# Patient Record
Sex: MALE | Race: OTHER | Employment: OTHER | URBAN - METROPOLITAN AREA
[De-identification: names, ages, dates, MRNs, and addresses within clinical notes are randomized per-mention and may not be internally consistent; named-entity substitution may affect disease eponyms.]

---

## 2018-02-13 ENCOUNTER — APPOINTMENT (OUTPATIENT)
Dept: CT IMAGING | Age: 63
End: 2018-02-13
Attending: EMERGENCY MEDICINE
Payer: COMMERCIAL

## 2018-02-13 ENCOUNTER — APPOINTMENT (OUTPATIENT)
Dept: CT IMAGING | Age: 63
End: 2018-02-13
Attending: PHYSICIAN ASSISTANT
Payer: COMMERCIAL

## 2018-02-13 ENCOUNTER — HOSPITAL ENCOUNTER (OUTPATIENT)
Age: 63
Setting detail: OBSERVATION
Discharge: HOME | End: 2018-02-14
Attending: EMERGENCY MEDICINE | Admitting: HOSPITALIST
Payer: COMMERCIAL

## 2018-02-13 ENCOUNTER — APPOINTMENT (OUTPATIENT)
Dept: MRI IMAGING | Age: 63
End: 2018-02-13
Attending: EMERGENCY MEDICINE
Payer: COMMERCIAL

## 2018-02-13 DIAGNOSIS — R20.0 LEFT SIDED NUMBNESS: Primary | ICD-10-CM

## 2018-02-13 DIAGNOSIS — R51.9 ACUTE NONINTRACTABLE HEADACHE, UNSPECIFIED HEADACHE TYPE: ICD-10-CM

## 2018-02-13 DIAGNOSIS — G45.9 TRANSIENT CEREBRAL ISCHEMIA, UNSPECIFIED TYPE: ICD-10-CM

## 2018-02-13 DIAGNOSIS — E86.0 DEHYDRATION: ICD-10-CM

## 2018-02-13 PROBLEM — Z86.73 HISTORY OF CVA (CEREBROVASCULAR ACCIDENT): Status: ACTIVE | Noted: 2018-02-13

## 2018-02-13 PROBLEM — Z85.850 HISTORY OF THYROID CANCER: Status: ACTIVE | Noted: 2018-02-13

## 2018-02-13 PROBLEM — I10 HYPERTENSION: Status: ACTIVE | Noted: 2018-02-13

## 2018-02-13 LAB
ALBUMIN SERPL-MCNC: 4 G/DL (ref 3.4–5)
ALBUMIN/GLOB SERPL: 1.1 {RATIO} (ref 0.8–1.7)
ALP SERPL-CCNC: 59 U/L (ref 45–117)
ALT SERPL-CCNC: 41 U/L (ref 16–61)
ANION GAP SERPL CALC-SCNC: 7 MMOL/L (ref 3–18)
APPEARANCE UR: CLEAR
AST SERPL-CCNC: 22 U/L (ref 15–37)
ATRIAL RATE: 82 BPM
BACTERIA URNS QL MICRO: NEGATIVE /HPF
BASOPHILS # BLD: 0 K/UL (ref 0–0.06)
BASOPHILS NFR BLD: 0 % (ref 0–2)
BILIRUB SERPL-MCNC: 0.3 MG/DL (ref 0.2–1)
BILIRUB UR QL: NEGATIVE
BUN SERPL-MCNC: 17 MG/DL (ref 7–18)
BUN/CREAT SERPL: 13 (ref 12–20)
CALCIUM SERPL-MCNC: 8.2 MG/DL (ref 8.5–10.1)
CALCULATED P AXIS, ECG09: 46 DEGREES
CALCULATED T AXIS, ECG11: 25 DEGREES
CHLORIDE SERPL-SCNC: 104 MMOL/L (ref 100–108)
CK MB CFR SERPL CALC: 0.8 % (ref 0–4)
CK MB SERPL-MCNC: 1.9 NG/ML (ref 5–25)
CK SERPL-CCNC: 237 U/L (ref 39–308)
CO2 SERPL-SCNC: 31 MMOL/L (ref 21–32)
COLOR UR: YELLOW
CREAT SERPL-MCNC: 1.26 MG/DL (ref 0.6–1.3)
DIAGNOSIS, 93000: NORMAL
DIFFERENTIAL METHOD BLD: ABNORMAL
EOSINOPHIL # BLD: 0.1 K/UL (ref 0–0.4)
EOSINOPHIL NFR BLD: 1 % (ref 0–5)
EPITH CASTS URNS QL MICRO: NEGATIVE /LPF (ref 0–5)
ERYTHROCYTE [DISTWIDTH] IN BLOOD BY AUTOMATED COUNT: 13.5 % (ref 11.6–14.5)
GLOBULIN SER CALC-MCNC: 3.5 G/DL (ref 2–4)
GLUCOSE BLD STRIP.AUTO-MCNC: 88 MG/DL (ref 70–110)
GLUCOSE SERPL-MCNC: 93 MG/DL (ref 74–99)
GLUCOSE UR STRIP.AUTO-MCNC: NEGATIVE MG/DL
HCT VFR BLD AUTO: 48.8 % (ref 36–48)
HGB BLD-MCNC: 16.2 G/DL (ref 13–16)
HGB UR QL STRIP: ABNORMAL
KETONES UR QL STRIP.AUTO: NEGATIVE MG/DL
LEUKOCYTE ESTERASE UR QL STRIP.AUTO: NEGATIVE
LYMPHOCYTES # BLD: 2.7 K/UL (ref 0.9–3.6)
LYMPHOCYTES NFR BLD: 33 % (ref 21–52)
MAGNESIUM SERPL-MCNC: 2 MG/DL (ref 1.6–2.6)
MCH RBC QN AUTO: 30.8 PG (ref 24–34)
MCHC RBC AUTO-ENTMCNC: 33.2 G/DL (ref 31–37)
MCV RBC AUTO: 92.8 FL (ref 74–97)
MONOCYTES # BLD: 0.8 K/UL (ref 0.05–1.2)
MONOCYTES NFR BLD: 10 % (ref 3–10)
NEUTS SEG # BLD: 4.6 K/UL (ref 1.8–8)
NEUTS SEG NFR BLD: 56 % (ref 40–73)
NITRITE UR QL STRIP.AUTO: NEGATIVE
P-R INTERVAL, ECG05: 170 MS
PH UR STRIP: 5.5 [PH] (ref 5–8)
PLATELET # BLD AUTO: 220 K/UL (ref 135–420)
PMV BLD AUTO: 9.3 FL (ref 9.2–11.8)
POTASSIUM SERPL-SCNC: 4 MMOL/L (ref 3.5–5.5)
PROT SERPL-MCNC: 7.5 G/DL (ref 6.4–8.2)
PROT UR STRIP-MCNC: NEGATIVE MG/DL
Q-T INTERVAL, ECG07: 348 MS
QRS DURATION, ECG06: 84 MS
QTC CALCULATION (BEZET), ECG08: 406 MS
RBC # BLD AUTO: 5.26 M/UL (ref 4.7–5.5)
RBC #/AREA URNS HPF: NEGATIVE /HPF (ref 0–5)
SODIUM SERPL-SCNC: 142 MMOL/L (ref 136–145)
SP GR UR REFRACTOMETRY: 1.01 (ref 1–1.03)
TROPONIN I SERPL-MCNC: <0.02 NG/ML (ref 0–0.06)
UROBILINOGEN UR QL STRIP.AUTO: 0.2 EU/DL (ref 0.2–1)
VENTRICULAR RATE, ECG03: 82 BPM
WBC # BLD AUTO: 8.2 K/UL (ref 4.6–13.2)
WBC URNS QL MICRO: NEGATIVE /HPF (ref 0–5)

## 2018-02-13 PROCEDURE — 99285 EMERGENCY DEPT VISIT HI MDM: CPT

## 2018-02-13 PROCEDURE — 70498 CT ANGIOGRAPHY NECK: CPT

## 2018-02-13 PROCEDURE — 70551 MRI BRAIN STEM W/O DYE: CPT

## 2018-02-13 PROCEDURE — 82962 GLUCOSE BLOOD TEST: CPT

## 2018-02-13 PROCEDURE — 74011250637 HC RX REV CODE- 250/637: Performed by: HOSPITALIST

## 2018-02-13 PROCEDURE — 80053 COMPREHEN METABOLIC PANEL: CPT | Performed by: EMERGENCY MEDICINE

## 2018-02-13 PROCEDURE — 92610 EVALUATE SWALLOWING FUNCTION: CPT

## 2018-02-13 PROCEDURE — 70450 CT HEAD/BRAIN W/O DYE: CPT

## 2018-02-13 PROCEDURE — 74011250637 HC RX REV CODE- 250/637: Performed by: PHYSICIAN ASSISTANT

## 2018-02-13 PROCEDURE — 82550 ASSAY OF CK (CPK): CPT | Performed by: EMERGENCY MEDICINE

## 2018-02-13 PROCEDURE — G8998 SWALLOW D/C STATUS: HCPCS

## 2018-02-13 PROCEDURE — 74011250636 HC RX REV CODE- 250/636: Performed by: EMERGENCY MEDICINE

## 2018-02-13 PROCEDURE — 85025 COMPLETE CBC W/AUTO DIFF WBC: CPT | Performed by: EMERGENCY MEDICINE

## 2018-02-13 PROCEDURE — 74011636320 HC RX REV CODE- 636/320: Performed by: EMERGENCY MEDICINE

## 2018-02-13 PROCEDURE — 74011250636 HC RX REV CODE- 250/636: Performed by: PHYSICIAN ASSISTANT

## 2018-02-13 PROCEDURE — 96361 HYDRATE IV INFUSION ADD-ON: CPT

## 2018-02-13 PROCEDURE — G8996 SWALLOW CURRENT STATUS: HCPCS

## 2018-02-13 PROCEDURE — G8997 SWALLOW GOAL STATUS: HCPCS

## 2018-02-13 PROCEDURE — 93005 ELECTROCARDIOGRAM TRACING: CPT

## 2018-02-13 PROCEDURE — 81001 URINALYSIS AUTO W/SCOPE: CPT | Performed by: EMERGENCY MEDICINE

## 2018-02-13 PROCEDURE — 83735 ASSAY OF MAGNESIUM: CPT | Performed by: EMERGENCY MEDICINE

## 2018-02-13 PROCEDURE — 99218 HC RM OBSERVATION: CPT

## 2018-02-13 PROCEDURE — 74011250637 HC RX REV CODE- 250/637: Performed by: EMERGENCY MEDICINE

## 2018-02-13 RX ORDER — LOSARTAN POTASSIUM 100 MG/1
100 TABLET ORAL DAILY
COMMUNITY

## 2018-02-13 RX ORDER — LOSARTAN POTASSIUM 50 MG/1
25 TABLET ORAL DAILY
Status: DISCONTINUED | OUTPATIENT
Start: 2018-02-14 | End: 2018-02-13

## 2018-02-13 RX ORDER — CLOPIDOGREL BISULFATE 75 MG/1
75 TABLET ORAL DAILY
Status: DISCONTINUED | OUTPATIENT
Start: 2018-02-14 | End: 2018-02-14 | Stop reason: HOSPADM

## 2018-02-13 RX ORDER — OXCARBAZEPINE 150 MG/1
600 TABLET, FILM COATED ORAL 2 TIMES DAILY
Status: DISCONTINUED | OUTPATIENT
Start: 2018-02-13 | End: 2018-02-14 | Stop reason: HOSPADM

## 2018-02-13 RX ORDER — LABETALOL HYDROCHLORIDE 5 MG/ML
5 INJECTION, SOLUTION INTRAVENOUS
Status: DISCONTINUED | OUTPATIENT
Start: 2018-02-13 | End: 2018-02-14 | Stop reason: HOSPADM

## 2018-02-13 RX ORDER — SODIUM CHLORIDE 0.9 % (FLUSH) 0.9 %
5-10 SYRINGE (ML) INJECTION EVERY 8 HOURS
Status: DISCONTINUED | OUTPATIENT
Start: 2018-02-13 | End: 2018-02-14 | Stop reason: HOSPADM

## 2018-02-13 RX ORDER — ACETAMINOPHEN 325 MG/1
650 TABLET ORAL
Status: DISCONTINUED | OUTPATIENT
Start: 2018-02-13 | End: 2018-02-14 | Stop reason: HOSPADM

## 2018-02-13 RX ORDER — LOSARTAN POTASSIUM 50 MG/1
100 TABLET ORAL DAILY
Status: DISCONTINUED | OUTPATIENT
Start: 2018-02-14 | End: 2018-02-14 | Stop reason: HOSPADM

## 2018-02-13 RX ORDER — GUAIFENESIN 100 MG/5ML
162 LIQUID (ML) ORAL DAILY
Status: DISCONTINUED | OUTPATIENT
Start: 2018-02-14 | End: 2018-02-14 | Stop reason: HOSPADM

## 2018-02-13 RX ORDER — ASPIRIN 81 MG/1
162 TABLET ORAL DAILY
COMMUNITY

## 2018-02-13 RX ORDER — SODIUM CHLORIDE 0.9 % (FLUSH) 0.9 %
5-10 SYRINGE (ML) INJECTION AS NEEDED
Status: DISCONTINUED | OUTPATIENT
Start: 2018-02-13 | End: 2018-02-14 | Stop reason: HOSPADM

## 2018-02-13 RX ORDER — ATORVASTATIN CALCIUM 20 MG/1
40 TABLET, FILM COATED ORAL
Status: DISCONTINUED | OUTPATIENT
Start: 2018-02-13 | End: 2018-02-14 | Stop reason: HOSPADM

## 2018-02-13 RX ORDER — ATORVASTATIN CALCIUM 40 MG/1
40 TABLET, FILM COATED ORAL DAILY
COMMUNITY

## 2018-02-13 RX ORDER — CYCLOBENZAPRINE HCL 10 MG
10 TABLET ORAL
COMMUNITY

## 2018-02-13 RX ORDER — LEVOTHYROXINE SODIUM 75 UG/1
75 TABLET ORAL
COMMUNITY

## 2018-02-13 RX ORDER — LANOLIN ALCOHOL/MO/W.PET/CERES
1000 CREAM (GRAM) TOPICAL DAILY
COMMUNITY

## 2018-02-13 RX ORDER — GUAIFENESIN 100 MG/5ML
162 LIQUID (ML) ORAL
Status: COMPLETED | OUTPATIENT
Start: 2018-02-13 | End: 2018-02-13

## 2018-02-13 RX ORDER — SODIUM CHLORIDE 9 MG/ML
125 INJECTION, SOLUTION INTRAVENOUS CONTINUOUS
Status: DISPENSED | OUTPATIENT
Start: 2018-02-13 | End: 2018-02-14

## 2018-02-13 RX ORDER — LEVOTHYROXINE SODIUM 75 UG/1
75 TABLET ORAL
Status: DISCONTINUED | OUTPATIENT
Start: 2018-02-14 | End: 2018-02-14 | Stop reason: HOSPADM

## 2018-02-13 RX ORDER — CLOPIDOGREL BISULFATE 75 MG/1
75 TABLET ORAL DAILY
COMMUNITY

## 2018-02-13 RX ADMIN — OXCARBAZEPINE 600 MG: 150 TABLET ORAL at 21:35

## 2018-02-13 RX ADMIN — SODIUM CHLORIDE 1000 ML: 900 INJECTION, SOLUTION INTRAVENOUS at 14:06

## 2018-02-13 RX ADMIN — ATORVASTATIN CALCIUM 40 MG: 20 TABLET, FILM COATED ORAL at 21:35

## 2018-02-13 RX ADMIN — IOPAMIDOL 100 ML: 755 INJECTION, SOLUTION INTRAVENOUS at 16:20

## 2018-02-13 RX ADMIN — ASPIRIN 81 MG 162 MG: 81 TABLET ORAL at 14:06

## 2018-02-13 RX ADMIN — Medication 10 ML: at 18:08

## 2018-02-13 RX ADMIN — SODIUM CHLORIDE 125 ML/HR: 900 INJECTION, SOLUTION INTRAVENOUS at 18:07

## 2018-02-13 RX ADMIN — Medication 10 ML: at 21:36

## 2018-02-13 NOTE — PROGRESS NOTES
Admission Medication Reconciliation has been performed on this ED patient consisting of interview of the patient regarding their PTA Home Medication List, Allergies and PMH as well as obtaining outpatient pharmacy information. Interviewed patient who was a good historian and is health literate. Patient did not provide a written list of home medications. Medications are managed by: self  Patient's outpatient pharmacy is [facility]  Smoking status is: denies  Alcohol use: denies  Illicit drug use: denies  Patient ABX use within the past 30 days = none  Has patient received any antineoplastics in the past 30 days? na  Has patient received any radiation treatments in the past 45 days? No but last radiation tx for thyroid CA was 2yr ago      Medication Reconciliation Interventions:   Wrong Medication Identified 0  Wrong/missing medication strength or dose identified  0  Wrong/missing Interval Identified 0  Wrong/missing Route Identified 0  Medication Duplication 0  Omissions 7  Commissions 0  Other Issue(s) Identified (Indicate): 0            Medication Compliance Issues and/or Medication Concerns: Added hyperlipidemia, HTN, seizures, hx of thyroidectomy to [facility] list as the patient's PTA Med List contains medication for this indication. Pt states despite thyroidectomy in 1976 he developed tumors in the thyroid area several time and received radiation tx with last tx ~ 2yr ago. Pt states he currently has growth in thyroid area but it has been determined to be benign. Note: if Oxtellar is to be continued for admission pt will need to bring from home as it is a non formulary drug.       [facility] Pharmacist  [phone]
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
## 2018-02-13 NOTE — IP AVS SNAPSHOT
Summary of Care Report The Summary of Care report has been created to help improve care coordination. Users with access to [employer] or [address] (Web-based application) may access additional patient information including the Discharge Summary. If you are not currently a [address] user and need more information, please call the number listed below in the [place] [address] section and ask to be connected with Medical Records. Facility Information Name Address Phone [address] [facility] [address] [phone] Patient Information Patient Name Sex NASH Guerrero ([id number]) Male 1955 Discharge Information Admitting Provider Service Area Unit Victor Hugo Brar MD / [phone] 8 [address] Card/Med / [phone] Discharge Provider Discharge Date/Time Discharge Disposition Destination (none) 2018 (Pending) AHR (none) Patient Language Language ENGLISH [13] Hospital Problems as of 2018  Never Reviewed Class Noted - Resolved Last Modified POA Active Problems * (Principal)TIA (transient ischemic attack)  2018 - Present 2018 by Victor Hugo Brar MD Yes Entered by Buster Payne MD  
  History of CVA (cerebrovascular accident)  2018 - Present 2018 by Maria Del Carmen Barrera PA-C Unknown Entered by Maria Del Carmen Barrera PA-C Hypertension  2018 - Present 2018 by Maria Del Carmen Barrera PA-C Unknown Entered by Maria Del Carmen Barrera PA-C History of thyroid cancer  2018 - Present 2018 by Maria Del Carmen Barrera PA-C Unknown Entered by Maria Del Carmen Barrera PA-C You are allergic to the following No active allergies Current Discharge Medication List  
  
CONTINUE these medications which have NOT CHANGED Dose & Instructions Dispensing Information Comments  
 aspirin delayed-release 81 mg tablet Dose:  162 mg Take 162 mg by mouth daily. Refills:  0  
   
 cyanocobalamin 1,000 mcg tablet Dose:  1000 mcg Take 1,000 mcg by mouth daily. Refills:  0  
   
 cyclobenzaprine 10 mg tablet Commonly known as:  FLEXERIL Dose:  10 mg Take 10 mg by mouth three (3) times daily as needed for Muscle Spasm(s). Refills:  0 LIPITOR 40 mg tablet Generic drug:  atorvastatin Dose:  40 mg Take 40 mg by mouth daily. Refills:  0  
   
 losartan 100 mg tablet Commonly known as:  COZAAR Dose:  100 mg Take 100 mg by mouth daily. Refills:  0 OXTELLAR  mg Tb24 Generic drug:  OXcarbazepine Dose:  1 Tab Take 1 Tab by mouth two (2) times a day. At noon and again at bedtime Refills:  0 PLAVIX 75 mg Tab Generic drug:  clopidogrel Dose:  75 mg Take 75 mg by mouth daily. Refills:  0  
   
 SYNTHROID 75 mcg tablet Generic drug:  levothyroxine Dose:  75 mcg Take 75 mcg by mouth Daily (before breakfast). Refills:  0 Follow-up Information Follow up With Details Comments Contact Info Phys Other, MD   Patient can only remember the practice name and not the physician Discharge Instructions DISCHARGE SUMMARY from Nurse PATIENT INSTRUCTIONS: 
 
Recommended activity: Activity as tolerated. *  Please give a list of your current medications to your Primary Care Provider. *  Please update this list whenever your medications are discontinued, doses are 
    changed, or new medications (including over-the-counter products) are added. *  Please carry medication information at all times in case of emergency situations. These are general instructions for a healthy lifestyle: No smoking/ No tobacco products/ Avoid exposure to second hand smoke Surgeon General's Warning:  Quitting smoking now greatly reduces serious risk to your health. Obesity, smoking, and sedentary lifestyle greatly increases your risk for illness A healthy diet, regular physical exercise & weight monitoring are important for maintaining a healthy lifestyle You may be retaining fluid if you have a history of heart failure or if you experience any of the following symptoms:  Weight gain of 3 pounds or more overnight or 5 pounds in a week, increased swelling in our hands or feet or shortness of breath while lying flat in bed. Please call your doctor as soon as you notice any of these symptoms; do not wait until your next office visit. Recognize signs and symptoms of STROKE: 
 
F-face looks uneven A-arms unable to move or move unevenly S-speech slurred or non-existent T-time-call 911 as soon as signs and symptoms begin-DO NOT go Back to bed or wait to see if you get better-TIME IS BRAIN. Warning Signs of HEART ATTACK Call 911 if you have these symptoms: 
? Chest discomfort. Most heart attacks involve discomfort in the center of the chest that lasts more than a few minutes, or that goes away and comes back. It can feel like uncomfortable pressure, squeezing, fullness, or pain. ? Discomfort in other areas of the upper body. Symptoms can include pain or discomfort in one or both arms, the back, neck, jaw, or stomach. ? Shortness of breath with or without chest discomfort. ? Other signs may include breaking out in a cold sweat, nausea, or lightheadedness. Don't wait more than five minutes to call [address]! Fast action can save your life. Calling 911 is almost always the fastest way to get lifesaving treatment. Emergency Medical Services staff can begin treatment when they arrive  up to an hour sooner than if someone gets to the hospital by car.   
 
The discharge information has been reviewed with the {PATIENT PARENT GUARDIAN:66393}. The {PATIENT PARENT GUARDIAN:77313} verbalized understanding. Discharge medications reviewed with the {Dishcarge meds reviewed TKLK:42414} and appropriate educational materials and side effects teaching were provided. ___________________________________________________________________________________________________________________________________ Headache: Care Instructions Your Care Instructions Headaches have many possible causes. Most headaches aren't a sign of a more serious problem, and they will get better on their own. Home treatment may help you feel better faster. The doctor has checked you carefully, but problems can develop later. If you notice any problems or new symptoms, get medical treatment right away. Follow-up care is a key part of your treatment and safety. Be sure to make and go to all appointments, and call your doctor if you are having problems. It's also a good idea to know your test results and keep a list of the medicines you take. How can you care for yourself at home? · Do not drive if you have taken a prescription pain medicine. · Rest in a quiet, dark room until your headache is gone. Close your eyes and try to relax or go to sleep. Don't watch TV or read. · Put a cold, moist cloth or cold pack on the painful area for 10 to 20 minutes at a time. Put a thin cloth between the cold pack and your skin. · Use a warm, moist towel or a heating pad set on low to relax tight shoulder and neck muscles. · Have someone gently massage your neck and shoulders. · Take pain medicines exactly as directed. ¨ If the doctor gave you a prescription medicine for pain, take it as prescribed. ¨ If you are not taking a prescription pain medicine, ask your doctor if you can take an over-the-counter medicine. · Be careful not to take pain medicine more often than the instructions allow, because you may get worse or more frequent headaches when the medicine wears off. · Do not ignore new symptoms that occur with a headache, such as a fever, weakness or numbness, vision changes, or confusion. These may be signs of a more serious problem. To prevent headaches · Keep a headache diary so you can figure out what triggers your headaches. Avoiding triggers may help you prevent headaches. Record when each headache began, how long it lasted, and what the pain was like (throbbing, aching, stabbing, or dull). Write down any other symptoms you had with the headache, such as nausea, flashing lights or dark spots, or sensitivity to bright light or loud noise. Note if the headache occurred near your period. List anything that might have triggered the headache, such as certain foods (chocolate, cheese, wine) or odors, smoke, bright light, stress, or lack of sleep. · Find healthy ways to deal with stress. Headaches are most common during or right after stressful times. Take time to relax before and after you do something that has caused a headache in the past. 
· Try to keep your muscles relaxed by keeping good posture. Check your jaw, face, neck, and shoulder muscles for tension, and try relaxing them. When sitting at a desk, change positions often, and stretch for 30 seconds each hour. · Get plenty of sleep and exercise. · Eat regularly and well. Long periods without food can trigger a headache. · Treat yourself to a massage. Some people find that regular massages are very helpful in relieving tension. · Limit caffeine by not drinking too much coffee, tea, or soda. But don't quit caffeine suddenly, because that can also give you headaches. · Reduce eyestrain from computers by blinking frequently and looking away from the computer screen every so often. Make sure you have proper eyewear and that your monitor is set up properly, about an arm's length away. · Seek help if you have depression or anxiety.  Your headaches may be linked to these conditions. Treatment can both prevent headaches and help with symptoms of anxiety or depression. When should you call for help? Call 911 anytime you think you may need emergency care. For example, call if: 
? · You have signs of a stroke. These may include: 
¨ Sudden numbness, paralysis, or weakness in your face, arm, or leg, especially on only one side of your body. ¨ Sudden vision changes. ¨ Sudden trouble speaking. ¨ Sudden confusion or trouble understanding simple statements. ¨ Sudden problems with walking or balance. ¨ A sudden, severe headache that is different from past headaches. ?Call your doctor now or seek immediate medical care if: 
? · You have a new or worse headache. ? · Your headache gets much worse. Where can you learn more? Go to http://holland-karine.info/. Enter M271 in the search box to learn more about \"Headache: Care Instructions. \" Current as of: October 14, 2016 Content Version: 11.4 © 0756-7599 [employer]ES[employer]. Care instructions adapted under license by [employer] (which disclaims liability or warranty for this information). If you have questions about a medical condition or this instruction, always ask your healthcare professional. [place] [address] any warranty or liability for your use of this information. High Blood Pressure: Care Instructions Your Care Instructions If your blood pressure is usually above 140/90, you have high blood pressure, or hypertension. That means the top number is 140 or higher or the bottom number is 90 or higher, or both. Despite what a lot of people think, high blood pressure usually doesn't cause headaches or make you feel dizzy or lightheaded. It usually has no symptoms. But it does increase your risk for heart attack, stroke, and kidney or eye damage. The higher your blood pressure, the more your risk increases. Your doctor will give you a goal for your blood pressure. Your goal will be based on your health and your age. An example of a goal is to keep your blood pressure below 140/90. Lifestyle changes, such as eating healthy and being active, are always important to help lower blood pressure. You might also take medicine to reach your blood pressure goal. 
Follow-up care is a key part of your treatment and safety. Be sure to make and go to all appointments, and call your doctor if you are having problems. It's also a good idea to know your test results and keep a list of the medicines you take. How can you care for yourself at home? Medical treatment · If you stop taking your medicine, your blood pressure will go back up. You may take one or more types of medicine to lower your blood pressure. Be safe with medicines. Take your medicine exactly as prescribed. Call your doctor if you think you are having a problem with your medicine. · Talk to your doctor before you start taking aspirin every day. Aspirin can help certain people lower their risk of a heart attack or stroke. But taking aspirin isn't right for everyone, because it can cause serious bleeding. · See your doctor regularly. You may need to see the doctor more often at first or until your blood pressure comes down. · If you are taking blood pressure medicine, talk to your doctor before you take decongestants or anti-inflammatory medicine, such as ibuprofen. Some of these medicines can raise blood pressure. · Learn how to check your blood pressure at home. Lifestyle changes · Stay at a healthy weight. This is especially important if you put on weight around the waist. Losing even 10 pounds can help you lower your blood pressure. · If your doctor recommends it, get more exercise. Walking is a good choice. Bit by bit, increase the amount you walk every day. Try for at least 30 minutes on most days of the week.  You also may want to swim, bike, or do other activities. · Avoid or limit alcohol. Talk to your doctor about whether you can drink any alcohol. · Try to limit how much sodium you eat to less than 2,300 milligrams (mg) a day. Your doctor may ask you to try to eat less than 1,500 mg a day. · Eat plenty of fruits (such as bananas and oranges), vegetables, legumes, whole grains, and low-fat dairy products. · Lower the amount of saturated fat in your diet. Saturated fat is found in animal products such as milk, cheese, and meat. Limiting these foods may help you lose weight and also lower your risk for heart disease. · Do not smoke. Smoking increases your risk for heart attack and stroke. If you need help quitting, talk to your doctor about stop-smoking programs and medicines. These can increase your chances of quitting for good. When should you call for help? Call 911 anytime you think you may need emergency care. This may mean having symptoms that suggest that your blood pressure is causing a serious heart or blood vessel problem. Your blood pressure may be over 180/110. ? For example, call 911 if: 
? · You have symptoms of a heart attack. These may include: ¨ Chest pain or pressure, or a strange feeling in the chest. 
¨ Sweating. ¨ Shortness of breath. ¨ Nausea or vomiting. ¨ Pain, pressure, or a strange feeling in the back, neck, jaw, or upper belly or in one or both shoulders or arms. ¨ Lightheadedness or sudden weakness. ¨ A fast or irregular heartbeat. ? · You have symptoms of a stroke. These may include: 
¨ Sudden numbness, tingling, weakness, or loss of movement in your face, arm, or leg, especially on only one side of your body. ¨ Sudden vision changes. ¨ Sudden trouble speaking. ¨ Sudden confusion or trouble understanding simple statements. ¨ Sudden problems with walking or balance. ¨ A sudden, severe headache that is different from past headaches. ? · You have severe back or belly pain. ?Do not wait until your blood pressure comes down on its own. Get help right away. ?Call your doctor now or seek immediate care if: 
? · Your blood pressure is much higher than normal (such as 180/110 or higher), but you don't have symptoms. ? · You think high blood pressure is causing symptoms, such as: ¨ Severe headache. ¨ Blurry vision. ? Watch closely for changes in your health, and be sure to contact your doctor if: 
? · Your blood pressure measures 140/90 or higher at least 2 times. That means the top number is 140 or higher or the bottom number is 90 or higher, or both. ? · You think you may be having side effects from your blood pressure medicine. ? · Your blood pressure is usually normal, but it goes above normal at least 2 times. Where can you learn more? Go to http://holland-karnie.info/. Enter V995 in the search box to learn more about \"High Blood Pressure: Care Instructions. \" Current as of: September 21, 2016 Content Version: 11.4 © 6767-5300 [employer]eecho. Care instructions adapted under license by [employer] (which disclaims liability or warranty for this information). If you have questions about a medical condition or this instruction, always ask your healthcare professional. [place] [address] any warranty or liability for your use of this information. Transient Ischemic Attack: Care Instructions Your Care Instructions A transient ischemic attack (TIA) is when blood flow to a part of your brain is blocked for a short time. A TIA is like a stroke but usually lasts only a few minutes. A TIA does not cause lasting brain damage. Any vision problems, slurred speech, or other symptoms usually go away in 10 to 20 minutes. But they may last for up to 24 hours. TIAs are often warning signs of a stroke. Some people who have a TIA may have a stroke in the future.  A stroke can cause symptoms like those of a TIA. But a stroke causes lasting damage to your brain. You can take steps to help prevent a stroke. One thing you can do is get early treatment. If you have other new symptoms, or if your symptoms do not get better, go back to the emergency room or call your doctor right away. Getting treatment right away may prevent long-term brain damage caused by a stroke. The doctor has checked you carefully, but problems can develop later. If you notice any problems or new symptoms, get medical treatment right away. Follow-up care is a key part of your treatment and safety. Be sure to make and go to all appointments, and call your doctor if you are having problems. It's also a good idea to know your test results and keep a list of the medicines you take. How can you care for yourself at home? Medicines ? · Be safe with medicines. Take your medicines exactly as prescribed. Call your doctor if you think you are having a problem with your medicine. ? · If you take a blood thinner, such as aspirin, be sure you get instructions about how to take your medicine safely. Blood thinners can cause serious bleeding problems. ? · Call your doctor if you are not able to take your medicines for any reason. ? · Do not take any over-the-counter medicines or herbal products without talking to your doctor first.  
? · If you take birth control pills or hormone therapy, talk to your doctor. Ask if these treatments are right for you. ? Lifestyle changes ? · Do not smoke. If you need help quitting, talk to your doctor about stop-smoking programs and medicines. ? · Be active. If your doctor recommends it, get more exercise. Walking is a good choice. Bit by bit, increase the amount you walk every day. Try for at least 30 minutes on most days of the week. You also may want to swim, bike, or do other activities. ? · Eat heart-healthy foods.  These include fruits, vegetables, high-fiber foods, fish, and foods that are low in sodium, saturated fat, and trans fat. ? · Stay at a healthy weight. Lose weight if you need to.  
? · Limit alcohol to 2 drinks a day for men and 1 drink a day for women. ?Staying healthy ? · Manage other health problems such as diabetes, high blood pressure, and high cholesterol. ? · Get the flu vaccine every year. When should you call for help? Call 911 anytime you think you may need emergency care. For example, call if: 
? · You have new or worse symptoms of a stroke. These may include: 
¨ Sudden numbness, tingling, weakness, or loss of movement in your face, arm, or leg, especially on only one side of your body. ¨ Sudden vision changes. ¨ Sudden trouble speaking. ¨ Sudden confusion or trouble understanding simple statements. ¨ Sudden problems with walking or balance. ¨ A sudden, severe headache that is different from past headaches. Call 911 even if these symptoms go away in a few minutes. ? · You feel like you are having another TIA. ? Watch closely for changes in your health, and be sure to contact your doctor if you have any problems. Where can you learn more? Go to http://holland-karine.info/. Enter [phone] in the search box to learn more about \"Transient Ischemic Attack: Care Instructions. \" Current as of: March 20, 2017 Content Version: 11.4 © 5420-2485 [employer]. Care instructions adapted under license by [employer] (which disclaims liability or warranty for this information). If you have questions about a medical condition or this instruction, always ask your healthcare professional. [place] [address] any warranty or liability for your use of this information. Chart Review Routing History No Routing History on File

## 2018-02-13 NOTE — IP AVS SNAPSHOT
[address] [address] 
[phone] Patient: Anny Camacho MRN: [id number] XTT:[id number] A check loraine indicates which time of day the medication should be taken. My Medications CONTINUE taking these medications Instructions Each Dose to Equal  
 Morning Noon Evening Bedtime  
 aspirin delayed-release 81 mg tablet Your last dose was: Your next dose is: Take 162 mg by mouth daily. 162 mg  
    
   
   
   
  
 cyanocobalamin 1,000 mcg tablet Your last dose was: Your next dose is: Take 1,000 mcg by mouth daily. 1000 mcg  
    
   
   
   
  
 cyclobenzaprine 10 mg tablet Commonly known as:  FLEXERIL Your last dose was: Your next dose is: Take 10 mg by mouth three (3) times daily as needed for Muscle Spasm(s). 10 mg  
    
   
   
   
  
 LIPITOR 40 mg tablet Generic drug:  atorvastatin Your last dose was: Your next dose is: Take 40 mg by mouth daily. 40 mg  
    
   
   
   
  
 losartan 100 mg tablet Commonly known as:  COZAAR Your last dose was: Your next dose is: Take 100 mg by mouth daily. 100 mg OXTELLAR  mg Tb24 Generic drug:  OXcarbazepine Your last dose was: Your next dose is: Take 1 Tab by mouth two (2) times a day. At noon and again at bedtime 1 Tab PLAVIX 75 mg Tab Generic drug:  clopidogrel Your last dose was: Your next dose is: Take 75 mg by mouth daily. 75 mg SYNTHROID 75 mcg tablet Generic drug:  levothyroxine Your last dose was: Your next dose is: Take 75 mcg by mouth Daily (before breakfast). 75 mcg

## 2018-02-13 NOTE — ED PROVIDER NOTES
EMERGENCY DEPARTMENT HISTORY AND PHYSICAL EXAM    Date: 2/13/2018  Patient Name: Jomar Angeles    History of Presenting Illness     Chief Complaint   Patient presents with    Headache    Extremity Weakness     L side         History Provided By: Patient    Chief Complaint: weakness  Duration: 4 Hours  Timing:  Acute  Location: right face and left extremities  Quality: weakness  Severity: Moderate  Associated Symptoms: denies any other associated signs or symptoms    Additional History (Context):   1:14 PM  Jomar Angeles is a 58 y.o. male with PMHX of TIA and stroke (no residual weakness) who presents to the emergency department via EMS C/O right face and left sided of body weakness onset 4 hours ago. Associated sxs include left hand and leg are weaker then right side. Prior to onset of sx's pt had mild Ha at base of skull. PMHX of metastatic thyroid cancer. [facility] and [facility] him here. Pt denies chest pain, SOB and any other sxs or complaints. PCP: Veronica Wilkins MD        Past History     Past Medical History:  No past medical history on file. Past Surgical History:  No past surgical history on file. Family History:  No family history on file. Social History:  Social History   Substance Use Topics    Smoking status: Not on file    Smokeless tobacco: Not on file    Alcohol use Not on file       Allergies:  No Known Allergies      Review of Systems   Review of Systems   Respiratory: Negative for shortness of breath. Cardiovascular: Negative for chest pain. Neurological: Positive for weakness and headaches (mild). All other systems reviewed and are negative. Physical Exam     Vitals:    02/13/18 1315   BP: (!) 170/131   Pulse: (!) 107   Resp: 22   SpO2: 100%     Physical Exam   Nursing note and vitals reviewed. Constitutional: Alert. Well appearing, no acute distress  Head: Normocephalic, Atraumatic. Mild dropping of left corner of mouth compared to right. endorses diminished sensation of right face compared to left  Eyes: Pupils are equal, round, and reactive to light, EOMI  ENT: Moist mucous membranes, oropharynx clear. Neck: Supple, non-tender  Cardiovascular: Regular rate and rhythm, no murmurs, rubs, or gallops  Chest: Normal work of breathing and chest excursion bilaterally. No reproducible chest tenderness. Lungs: Clear to ausculation bilaterally. Abdomen: Soft, non tender, non distended, normoactive bowel sounds  Back: No evidence of trauma or deformity. No CVA Tenderness. Extremities: No evidence of trauma or deformity, no LE edema  Skin: Warm and dry  Neuro: Alert and appropriate, facial movement symmetric, normal speech. normal  stregnth bilaterally. 4+/5 strength of left leg compared to right, otherwise all strength is symmetric. Psychiatric: Normal mood and affect     Diagnostic Study Results     Labs -     Recent Results (from the past 12 hour(s))   GLUCOSE, POC    Collection Time: 02/13/18  1:12 PM   Result Value Ref Range    Glucose (POC) 88 70 - 110 mg/dL   CBC WITH AUTOMATED DIFF    Collection Time: 02/13/18  1:15 PM   Result Value Ref Range    WBC 8.2 4.6 - 13.2 K/uL    RBC 5.26 4.70 - 5.50 M/uL    HGB 16.2 (H) 13.0 - 16.0 g/dL    HCT 48.8 (H) 36.0 - 48.0 %    MCV 92.8 74.0 - 97.0 FL    MCH 30.8 24.0 - 34.0 PG    MCHC 33.2 31.0 - 37.0 g/dL    RDW 13.5 11.6 - 14.5 %    PLATELET 820 590 - 567 K/uL    MPV 9.3 9.2 - 11.8 FL    NEUTROPHILS 56 40 - 73 %    LYMPHOCYTES 33 21 - 52 %    MONOCYTES 10 3 - 10 %    EOSINOPHILS 1 0 - 5 %    BASOPHILS 0 0 - 2 %    ABS. NEUTROPHILS 4.6 1.8 - 8.0 K/UL    ABS. LYMPHOCYTES 2.7 0.9 - 3.6 K/UL    ABS. MONOCYTES 0.8 0.05 - 1.2 K/UL    ABS. EOSINOPHILS 0.1 0.0 - 0.4 K/UL    ABS.  BASOPHILS 0.0 0.0 - 0.06 K/UL    DF AUTOMATED     MAGNESIUM    Collection Time: 02/13/18  1:15 PM   Result Value Ref Range    Magnesium 2.0 1.6 - 2.6 mg/dL   METABOLIC PANEL, COMPREHENSIVE    Collection Time: 02/13/18  1:15 PM   Result Value Ref Range    Sodium 142 136 - 145 mmol/L    Potassium 4.0 3.5 - 5.5 mmol/L    Chloride 104 100 - 108 mmol/L    CO2 31 21 - 32 mmol/L    Anion gap 7 3.0 - 18 mmol/L    Glucose 93 74 - 99 mg/dL    BUN 17 7.0 - 18 MG/DL    Creatinine 1.26 0.6 - 1.3 MG/DL    BUN/Creatinine ratio 13 12 - 20      GFR est AA >60 >60 ml/min/1.73m2    GFR est non-AA 58 (L) >60 ml/min/1.73m2    Calcium 8.2 (L) 8.5 - 10.1 MG/DL    Bilirubin, total 0.3 0.2 - 1.0 MG/DL    ALT (SGPT) 41 16 - 61 U/L    AST (SGOT) 22 15 - 37 U/L    Alk. phosphatase 59 45 - 117 U/L    Protein, total 7.5 6.4 - 8.2 g/dL    Albumin 4.0 3.4 - 5.0 g/dL    Globulin 3.5 2.0 - 4.0 g/dL    A-G Ratio 1.1 0.8 - 1.7     CARDIAC PANEL,(CK, CKMB & TROPONIN)    Collection Time: 02/13/18  1:15 PM   Result Value Ref Range     39 - 308 U/L    CK - MB 1.9 <3.6 ng/ml    CK-MB Index 0.8 0.0 - 4.0 %    Troponin-I, Qt. <0.02 0.00 - 0.06 NG/ML   EKG, 12 LEAD, INITIAL    Collection Time: 02/13/18  1:55 PM   Result Value Ref Range    Ventricular Rate 82 BPM    Atrial Rate 82 BPM    P-R Interval 170 ms    QRS Duration 84 ms    Q-T Interval 348 ms    QTC Calculation (Bezet) 406 ms    Calculated P Axis 46 degrees    Calculated T Axis 25 degrees    Diagnosis       Normal sinus rhythm  Normal ECG  No previous ECGs available         Radiologic Studies -     CT Results  (Last 48 hours)               02/13/18 1334  CT HEAD WO CONT Final result    Impression:  IMPRESSION:       1. No acute intracranial pathology. Code S results relayed to Dr. Verito Dubois, ER at 1:38 PM.       Narrative:  EXAMINATION:  CT head noncontrast       COMPARISON: None       HISTORY: Stroke alert, code S. Headaches       TECHNIQUE: Noncontrasted axial images were obtained through the patient's brain   from the vertex of the skull through the skull base. Bone and soft tissue   windows were reviewed.        One or more dose reduction techniques were used on this CT: automated exposure   control, adjustment of the mAs and/or kVp according to patient's size, and   iterative reconstruction techniques. The specific techniques utilized on this CT   exam have been documented in the patient's electronic medical record. FINDINGS: Brain architecture is normal. No mass effect, midline shift or   hemorrhage. Ventricles are normal in size, position and configuration. No   abnormal extra-axial fluid collections are seen. No territorial signs of   infarct. The bony calvarium appears intact without acute displaced fracture. The   visualized paranasal sinuses and mastoid air cells are aerated. CXR Results  (Last 48 hours)    None          Medications given in the ED-  Medications   sodium chloride (NS) flush 5-10 mL (not administered)   sodium chloride (NS) flush 5-10 mL (not administered)   0.9% sodium chloride infusion (not administered)   aspirin chewable tablet 162 mg (not administered)   clopidogrel (PLAVIX) tablet 75 mg (not administered)   atorvastatin (LIPITOR) tablet 40 mg (not administered)   acetaminophen (TYLENOL) tablet 650 mg (not administered)   labetalol (NORMODYNE;TRANDATE) injection 5 mg (not administered)   losartan (COZAAR) tablet 25 mg (not administered)   sodium chloride 0.9 % bolus infusion 1,000 mL (1,000 mL IntraVENous New Bag 2/13/18 1406)   aspirin chewable tablet 162 mg (162 mg Oral Given 2/13/18 1406)         Medical Decision Making   I am the first provider for this patient. I reviewed the vital signs, available nursing notes, past medical history, past surgical history, family history and social history. Vital Signs-Reviewed the patient's vital signs. Pulse Oximetry Analysis - 100% on RA     Cardiac Monitor:  Rate: 83 bpm  Rhythm: NSR    EKG interpretation: (Preliminary)  1:15 PM   NSR at 83 bpm. No ST segment change. T wave inversion in lead 3. Normal intervals.    EKG read by Tomasa Romero MD at 13:55     Records Reviewed: Nursing Notes    Procedures:  Procedures    ED Course:   1:14 PM Initial assessment performed. The patients presenting problems have been discussed, and they are in agreement with the care plan formulated and outlined with them. I have encouraged them to ask questions as they arise throughout their visit. 1:16 PM Code S called. 1:20 PM Discussed patient's history, exam, and available diagnostics results with Alisa Alaniz MD, tele neuro, who agree with evaluating pt.     1:48 PM Discussed patient's history, exam, and available diagnostics results with Alisa Alaniz MD, tele neuro, who states it could be vasovagal episode of vertebral vaso insufficiency. He recommends TIA work up. Pt is not TPA candidate.     2:21 PM Discussed patient's history, exam, and available diagnostics results with Homar Freeman MD, hospitalist, who agree with admitting pt.     5:49 PM The radiologist called and reports there is no acute occlusion on CTA. Diagnosis and Disposition     Core Measures:  For Hospitalized Patients:    1. Hospitalization Decision Time:  The decision to hospitalize the patient was made by Zahra Begum MD at 2:00 PM on 2/13/2018    2. Aspirin: Aspirin was given    2:23 PM  Patient is being admitted to the hospital by Homar Freeman MD. The results of their tests and reasons for their admission have been discussed with them and/or available family. They convey agreement and understanding for the need to be admitted and for their admission diagnosis. CONDITIONS ON ADMISSION:  Sepsis is not present at the time of admission. Deep Vein Thrombosis is not present at the time of admission. Thrombosis is not present at the time of admission. Urinary Tract Infection is not present at the time of admission. Pneumonia is not present at the time of admission. MRSA is not present at the time of admission. Wound infection is not present at the time of admission. Pressure Ulcer is not present at the time of admission. CLINICAL IMPRESSION:    1.  Left sided numbness 2. Acute nonintractable headache, unspecified headache type    3. Dehydration    4. Transient cerebral ischemia, unspecified type         DISCUSSION:  58 y.o male presents with symptoms concerning for recurrent TIA. He had active sxs at time of presentation so he was called as a Code S. However, CT scan was normal and he is not a tPA candidate per teleneurologist, Tiana Hill MD.  Labs were reassuring for no acute process except they reflect mild dehydration, he received IVF. Will observe for TIA work up.   _______________________________    Attestations: This note is prepared by Alex Velázquez and Chris Paredes, acting as Scribe for Christina Pozo MD.    Christina Pozo MD:  The scribe's documentation has been prepared under my direction and personally reviewed by me in its entirety.   I confirm that the note above accurately reflects all work, treatment, procedures, and medical decision making performed by me.  _______________________________

## 2018-02-13 NOTE — PROGRESS NOTES
Problem: Dysphagia (Adult)  Goal: *Acute Goals and Plan of Care (Insert Text)  Outcome: Resolved/Met Date Met: 02/13/18  Speech LAnguage Pathology bedside swallow evaluation only    Patient: Pierre Kennedy (89 y.o. male)  Date: 2/13/2018  Primary Diagnosis: TIA (transient ischemic attack)  Precautions:      ASSESSMENT :  Based on the objective data described below, the patient presents with functional swallowing skills. Patient AOx4 during CBSE with good command following. Reports cognition & speech/language at baseline. Past history of CVAs resulting in mild thought disorganization, which is c/w baseline. (+) R facial droop, very mild, but labial seal is good. All oral structures intact for mastication & deglutition. Laryngeal elevation is present. Question mild pharyngeal structural deficits 2/2 thyroid radiation. Voice is intact & no h/o aspiration per patient. (+) throat clear x1 during PO trials; resolved s/sx with double swallow independently. Swallowing appears to be at baseline. Education re: dysphagia, aspiration risk, and CVA+Dysphagia provided; patient verbalized/demonstrated understanding. SLP signing off as swallowing is functional. Please re-consult should the patient's medical status change warranting continued ST services. PLAN :  Recommendations and Planned Interventions:  SLP signing off. Discharge Recommendations: None     SUBJECTIVE:   Patient stated I have had many strokes. OBJECTIVE:   No past medical history on file. No past surgical history on file.   Prior Level of Function/Home Situation:   Diet prior to admission: Regular   Current Diet:  Regular    Cognitive and Communication Status:  Neurologic State: Alert  Orientation Level: Oriented X4  Cognition: Appropriate decision making, Appropriate for age attention/concentration, Appropriate safety awareness, Follows commands  Perception: Appears intact  Perseveration: No perseveration noted  Safety/Judgement: Insight into deficits  Oral Assessment:  Oral Assessment  Labial: No impairment; Other (comment) (mild droop)  Dentition: Natural;Intact  Oral Hygiene: good  Lingual: No impairment  Velum: No impairment  Mandible: No impairment  Gag Reflex: Other (comment) (not assessed)  P.O. Trials:  Patient Position: 70HOB  Vocal quality prior to P.O.: No impairment  Consistency Presented: Thin liquid; Solid;Puree  How Presented: Successive swallows;Straw;Spoon;Self-fed/presented  Bolus Acceptance: No impairment  Bolus Formation/Control: No impairment  Propulsion: No impairment  Oral Residue: None  Initiation of Swallow: No impairment  Laryngeal Elevation: Functional  Aspiration Signs/Symptoms: Clear throat; Other (comment) (then double swallow to clear)  Pharyngeal Phase Characteristics: No impairment, issues, or problems   Effective Modifications: None  Cues for Modifications: None    Oral Phase Severity: No impairment  Pharyngeal Phase Severity : No impairment    GCODESwallowing:  Swallow Current Status CH= 0%   Swallow Goal Status CH= 0%   Swallow D/C Status CH= 0%    The severity rating is based on the following outcomes:  TONY Noms Swallow Level 7   Clinical Judgement    PAIN:  Start of Eval: 0  End of Eval: 0     After treatment:   []            Patient left in no apparent distress sitting up in chair  [x]            Patient left in no apparent distress in bed  [x]            Call bell left within reach  [x]            Nursing notified  []            Family present  []            Caregiver present  []            Bed alarm activated    COMMUNICATION/EDUCATION:   [x]            Aspiration precautions; swallow safety; compensatory techniques. [x]            Patient/family have participated as able in goal setting and plan of care. [x]            Patient/family agree to work toward stated goals and plan of care. []            Patient understands intent and goals of therapy; neutral about participation.   []            Patient unable to participate in goal setting/plan of care; educ ongoing with interdisciplinary staff  []         Posted safety precautions in patient's room.     Thank you for this referral.  CALLUM Martins  Time Calculation: 8 mins

## 2018-02-13 NOTE — ED TRIAGE NOTES
Pt  Was seen at [facility].Pt reports around 0930 developed a HA in back of head( describes as a hot poker in back of head); 1030 L side weakness with cramping in back of legs; with decrease sensation

## 2018-02-13 NOTE — ED NOTES
TRANSFER - OUT REPORT:    Verbal report given to [facility] RN(name) on Gudelia Tinsley  being transferred to Telemetry(unit) for routine progression of care       Report consisted of patients Situation, Background, Assessment and   Recommendations(SBAR). Information from the following report(s) SBAR, ED Summary, [facility] and Recent Results was reviewed with the receiving nurse. Lines:   Peripheral IV 02/13/18 Left Antecubital (Active)        Opportunity for questions and clarification was provided.       Patient transported with:   Monitor  Registered Nurse

## 2018-02-13 NOTE — IP AVS SNAPSHOT
[address] [address] 
[phone] Patient: Tiburcio French MRN: [id number] About your hospitalization You were admitted on:  February 13, 2018 You last received care in the:  [address] [place] [address] You were discharged on:  February 14, 2018 Why you were hospitalized Your primary diagnosis was:  Tia (Transient Ischemic Attack) Your diagnoses also included:  History Of Cva (Cerebrovascular Accident), Hypertension, History Of Thyroid Cancer Follow-up Information Follow up With Details Comments Contact Info Veronica Wilkins, MD   Patient can only remember the practice name and not the physician Discharge Orders None A check loraine indicates which time of day the medication should be taken. My Medications CONTINUE taking these medications Instructions Each Dose to Equal  
 Morning Noon Evening Bedtime  
 aspirin delayed-release 81 mg tablet Your last dose was: Your next dose is: Take 162 mg by mouth daily. 162 mg  
    
   
   
   
  
 cyanocobalamin 1,000 mcg tablet Your last dose was: Your next dose is: Take 1,000 mcg by mouth daily. 1000 mcg  
    
   
   
   
  
 cyclobenzaprine 10 mg tablet Commonly known as:  FLEXERIL Your last dose was: Your next dose is: Take 10 mg by mouth three (3) times daily as needed for Muscle Spasm(s). 10 mg  
    
   
   
   
  
 LIPITOR 40 mg tablet Generic drug:  atorvastatin Your last dose was: Your next dose is: Take 40 mg by mouth daily. 40 mg  
    
   
   
   
  
 losartan 100 mg tablet Commonly known as:  COZAAR Your last dose was: Your next dose is: Take 100 mg by mouth daily. 100 mg OXTELLAR  mg Tb24 Generic drug:  OXcarbazepine Your last dose was: Your next dose is: Take 1 Tab by mouth two (2) times a day. At noon and again at bedtime 1 Tab PLAVIX 75 mg Tab Generic drug:  clopidogrel Your last dose was: Your next dose is: Take 75 mg by mouth daily. 75 mg SYNTHROID 75 mcg tablet Generic drug:  levothyroxine Your last dose was: Your next dose is: Take 75 mcg by mouth Daily (before breakfast). 75 mcg Discharge Instructions DISCHARGE SUMMARY from Nurse PATIENT INSTRUCTIONS: 
 
Recommended activity: Activity as tolerated. *  Please give a list of your current medications to your Primary Care Provider. *  Please update this list whenever your medications are discontinued, doses are 
    changed, or new medications (including over-the-counter products) are added. *  Please carry medication information at all times in case of emergency situations. These are general instructions for a healthy lifestyle: No smoking/ No tobacco products/ Avoid exposure to second hand smoke Surgeon General's Warning:  Quitting smoking now greatly reduces serious risk to your health. Obesity, smoking, and sedentary lifestyle greatly increases your risk for illness A healthy diet, regular physical exercise & weight monitoring are important for maintaining a healthy lifestyle You may be retaining fluid if you have a history of heart failure or if you experience any of the following symptoms:  Weight gain of 3 pounds or more overnight or 5 pounds in a week, increased swelling in our hands or feet or shortness of breath while lying flat in bed. Please call your doctor as soon as you notice any of these symptoms; do not wait until your next office visit. Recognize signs and symptoms of STROKE: 
 
F-face looks uneven A-arms unable to move or move unevenly S-speech slurred or non-existent T-time-call 911 as soon as signs and symptoms begin-DO NOT go Back to bed or wait to see if you get better-TIME IS BRAIN. Warning Signs of HEART ATTACK Call 911 if you have these symptoms: 
? Chest discomfort. Most heart attacks involve discomfort in the center of the chest that lasts more than a few minutes, or that goes away and comes back. It can feel like uncomfortable pressure, squeezing, fullness, or pain. ? Discomfort in other areas of the upper body. Symptoms can include pain or discomfort in one or both arms, the back, neck, jaw, or stomach. ? Shortness of breath with or without chest discomfort. ? Other signs may include breaking out in a cold sweat, nausea, or lightheadedness. Don't wait more than five minutes to call [address]! Fast action can save your life. Calling 911 is almost always the fastest way to get lifesaving treatment. Emergency Medical Services staff can begin treatment when they arrive  up to an hour sooner than if someone gets to the hospital by car. The discharge information has been reviewed with the {PATIENT PARENT GUARDIAN:03952}. The {PATIENT PARENT GUARDIAN:71111} verbalized understanding. Discharge medications reviewed with the {Dishcarge meds reviewed AIYY:64586} and appropriate educational materials and side effects teaching were provided. ___________________________________________________________________________________________________________________________________ Headache: Care Instructions Your Care Instructions Headaches have many possible causes. Most headaches aren't a sign of a more serious problem, and they will get better on their own. Home treatment may help you feel better faster. The doctor has checked you carefully, but problems can develop later. If you notice any problems or new symptoms, get medical treatment right away. Follow-up care is a key part of your treatment and safety. Be sure to make and go to all appointments, and call your doctor if you are having problems. It's also a good idea to know your test results and keep a list of the medicines you take. How can you care for yourself at home? · Do not drive if you have taken a prescription pain medicine. · Rest in a quiet, dark room until your headache is gone. Close your eyes and try to relax or go to sleep. Don't watch TV or read. · Put a cold, moist cloth or cold pack on the painful area for 10 to 20 minutes at a time. Put a thin cloth between the cold pack and your skin. · Use a warm, moist towel or a heating pad set on low to relax tight shoulder and neck muscles. · Have someone gently massage your neck and shoulders. · Take pain medicines exactly as directed. ¨ If the doctor gave you a prescription medicine for pain, take it as prescribed. ¨ If you are not taking a prescription pain medicine, ask your doctor if you can take an over-the-counter medicine. · Be careful not to take pain medicine more often than the instructions allow, because you may get worse or more frequent headaches when the medicine wears off. · Do not ignore new symptoms that occur with a headache, such as a fever, weakness or numbness, vision changes, or confusion. These may be signs of a more serious problem. To prevent headaches · Keep a headache diary so you can figure out what triggers your headaches. Avoiding triggers may help you prevent headaches. Record when each headache began, how long it lasted, and what the pain was like (throbbing, aching, stabbing, or dull). Write down any other symptoms you had with the headache, such as nausea, flashing lights or dark spots, or sensitivity to bright light or loud noise. Note if the headache occurred near your period.  List anything that might have triggered the headache, such as certain foods (chocolate, cheese, wine) or odors, smoke, bright light, stress, or lack of sleep. · Find healthy ways to deal with stress. Headaches are most common during or right after stressful times. Take time to relax before and after you do something that has caused a headache in the past. 
· Try to keep your muscles relaxed by keeping good posture. Check your jaw, face, neck, and shoulder muscles for tension, and try relaxing them. When sitting at a desk, change positions often, and stretch for 30 seconds each hour. · Get plenty of sleep and exercise. · Eat regularly and well. Long periods without food can trigger a headache. · Treat yourself to a massage. Some people find that regular massages are very helpful in relieving tension. · Limit caffeine by not drinking too much coffee, tea, or soda. But don't quit caffeine suddenly, because that can also give you headaches. · Reduce eyestrain from computers by blinking frequently and looking away from the computer screen every so often. Make sure you have proper eyewear and that your monitor is set up properly, about an arm's length away. · Seek help if you have depression or anxiety. Your headaches may be linked to these conditions. Treatment can both prevent headaches and help with symptoms of anxiety or depression. When should you call for help? Call 911 anytime you think you may need emergency care. For example, call if: 
? · You have signs of a stroke. These may include: 
¨ Sudden numbness, paralysis, or weakness in your face, arm, or leg, especially on only one side of your body. ¨ Sudden vision changes. ¨ Sudden trouble speaking. ¨ Sudden confusion or trouble understanding simple statements. ¨ Sudden problems with walking or balance. ¨ A sudden, severe headache that is different from past headaches. ?Call your doctor now or seek immediate medical care if: 
? · You have a new or worse headache. ? · Your headache gets much worse. Where can you learn more? Go to http://holland-karine.info/. Enter M271 in the search box to learn more about \"Headache: Care Instructions. \" Current as of: October 14, 2016 Content Version: 11.4 © 9617-9086 [employer]. Care instructions adapted under license by [employer] (which disclaims liability or warranty for this information). If you have questions about a medical condition or this instruction, always ask your healthcare professional. [place] [address] any warranty or liability for your use of this information. High Blood Pressure: Care Instructions Your Care Instructions If your blood pressure is usually above 140/90, you have high blood pressure, or hypertension. That means the top number is 140 or higher or the bottom number is 90 or higher, or both. Despite what a lot of people think, high blood pressure usually doesn't cause headaches or make you feel dizzy or lightheaded. It usually has no symptoms. But it does increase your risk for heart attack, stroke, and kidney or eye damage. The higher your blood pressure, the more your risk increases. Your doctor will give you a goal for your blood pressure. Your goal will be based on your health and your age. An example of a goal is to keep your blood pressure below 140/90. Lifestyle changes, such as eating healthy and being active, are always important to help lower blood pressure. You might also take medicine to reach your blood pressure goal. 
Follow-up care is a key part of your treatment and safety. Be sure to make and go to all appointments, and call your doctor if you are having problems. It's also a good idea to know your test results and keep a list of the medicines you take. How can you care for yourself at home? Medical treatment · If you stop taking your medicine, your blood pressure will go back up. You may take one or more types of medicine to lower your blood pressure. Be safe with medicines. Take your medicine exactly as prescribed. Call your doctor if you think you are having a problem with your medicine. · Talk to your doctor before you start taking aspirin every day. Aspirin can help certain people lower their risk of a heart attack or stroke. But taking aspirin isn't right for everyone, because it can cause serious bleeding. · See your doctor regularly. You may need to see the doctor more often at first or until your blood pressure comes down. · If you are taking blood pressure medicine, talk to your doctor before you take decongestants or anti-inflammatory medicine, such as ibuprofen. Some of these medicines can raise blood pressure. · Learn how to check your blood pressure at home. Lifestyle changes · Stay at a healthy weight. This is especially important if you put on weight around the waist. Losing even 10 pounds can help you lower your blood pressure. · If your doctor recommends it, get more exercise. Walking is a good choice. Bit by bit, increase the amount you walk every day. Try for at least 30 minutes on most days of the week. You also may want to swim, bike, or do other activities. · Avoid or limit alcohol. Talk to your doctor about whether you can drink any alcohol. · Try to limit how much sodium you eat to less than 2,300 milligrams (mg) a day. Your doctor may ask you to try to eat less than 1,500 mg a day. · Eat plenty of fruits (such as bananas and oranges), vegetables, legumes, whole grains, and low-fat dairy products. · Lower the amount of saturated fat in your diet. Saturated fat is found in animal products such as milk, cheese, and meat. Limiting these foods may help you lose weight and also lower your risk for heart disease. · Do not smoke. Smoking increases your risk for heart attack and stroke. If you need help quitting, talk to your doctor about stop-smoking programs and medicines. These can increase your chances of quitting for good. When should you call for help? Call 911 anytime you think you may need emergency care. This may mean having symptoms that suggest that your blood pressure is causing a serious heart or blood vessel problem. Your blood pressure may be over 180/110. ? For example, call 911 if: 
? · You have symptoms of a heart attack. These may include: ¨ Chest pain or pressure, or a strange feeling in the chest. 
¨ Sweating. ¨ Shortness of breath. ¨ Nausea or vomiting. ¨ Pain, pressure, or a strange feeling in the back, neck, jaw, or upper belly or in one or both shoulders or arms. ¨ Lightheadedness or sudden weakness. ¨ A fast or irregular heartbeat. ? · You have symptoms of a stroke. These may include: 
¨ Sudden numbness, tingling, weakness, or loss of movement in your face, arm, or leg, especially on only one side of your body. ¨ Sudden vision changes. ¨ Sudden trouble speaking. ¨ Sudden confusion or trouble understanding simple statements. ¨ Sudden problems with walking or balance. ¨ A sudden, severe headache that is different from past headaches. ? · You have severe back or belly pain. ?Do not wait until your blood pressure comes down on its own. Get help right away. ?Call your doctor now or seek immediate care if: 
? · Your blood pressure is much higher than normal (such as 180/110 or higher), but you don't have symptoms. ? · You think high blood pressure is causing symptoms, such as: ¨ Severe headache. ¨ Blurry vision. ? Watch closely for changes in your health, and be sure to contact your doctor if: 
? · Your blood pressure measures 140/90 or higher at least 2 times. That means the top number is 140 or higher or the bottom number is 90 or higher, or both. ? · You think you may be having side effects from your blood pressure medicine. ? · Your blood pressure is usually normal, but it goes above normal at least 2 times. Where can you learn more? Go to http://holland-karine.info/. Enter F487 in the search box to learn more about \"High Blood Pressure: Care Instructions. \" Current as of: September 21, 2016 Content Version: 11.4 © 9904-5713 [employer]. Care instructions adapted under license by [employer] (which disclaims liability or warranty for this information). If you have questions about a medical condition or this instruction, always ask your healthcare professional. [address] any warranty or liability for your use of this information. Transient Ischemic Attack: Care Instructions Your Care Instructions A transient ischemic attack (TIA) is when blood flow to a part of your brain is blocked for a short time. A TIA is like a stroke but usually lasts only a few minutes. A TIA does not cause lasting brain damage. Any vision problems, slurred speech, or other symptoms usually go away in 10 to 20 minutes. But they may last for up to 24 hours. TIAs are often warning signs of a stroke. Some people who have a TIA may have a stroke in the future. A stroke can cause symptoms like those of a TIA. But a stroke causes lasting damage to your brain. You can take steps to help prevent a stroke. One thing you can do is get early treatment. If you have other new symptoms, or if your symptoms do not get better, go back to the emergency room or call your doctor right away. Getting treatment right away may prevent long-term brain damage caused by a stroke. The doctor has checked you carefully, but problems can develop later. If you notice any problems or new symptoms, get medical treatment right away. Follow-up care is a key part of your treatment and safety. Be sure to make and go to all appointments, and call your doctor if you are having problems. It's also a good idea to know your test results and keep a list of the medicines you take. How can you care for yourself at home? Medicines ? · Be safe with medicines. Take your medicines exactly as prescribed. Call your doctor if you think you are having a problem with your medicine. ? · If you take a blood thinner, such as aspirin, be sure you get instructions about how to take your medicine safely. Blood thinners can cause serious bleeding problems. ? · Call your doctor if you are not able to take your medicines for any reason. ? · Do not take any over-the-counter medicines or herbal products without talking to your doctor first.  
? · If you take birth control pills or hormone therapy, talk to your doctor. Ask if these treatments are right for you. ? Lifestyle changes ? · Do not smoke. If you need help quitting, talk to your doctor about stop-smoking programs and medicines. ? · Be active. If your doctor recommends it, get more exercise. Walking is a good choice. Bit by bit, increase the amount you walk every day. Try for at least 30 minutes on most days of the week. You also may want to swim, bike, or do other activities. ? · Eat heart-healthy foods. These include fruits, vegetables, high-fiber foods, fish, and foods that are low in sodium, saturated fat, and trans fat. ? · Stay at a healthy weight. Lose weight if you need to.  
? · Limit alcohol to 2 drinks a day for men and 1 drink a day for women. ?Staying healthy ? · Manage other health problems such as diabetes, high blood pressure, and high cholesterol. ? · Get the flu vaccine every year. When should you call for help? Call 911 anytime you think you may need emergency care. For example, call if: 
? · You have new or worse symptoms of a stroke. These may include: 
¨ Sudden numbness, tingling, weakness, or loss of movement in your face, arm, or leg, especially on only one side of your body. ¨ Sudden vision changes. ¨ Sudden trouble speaking. ¨ Sudden confusion or trouble understanding simple statements. ¨ Sudden problems with walking or balance. ¨ A sudden, severe headache that is different from past headaches. Call 911 even if these symptoms go away in a few minutes. ? · You feel like you are having another TIA. ? Watch closely for changes in your health, and be sure to contact your doctor if you have any problems. Where can you learn more? Go to http://holland-karine.info/. Enter [phone] in the search box to learn more about \"Transient Ischemic Attack: Care Instructions. \" Current as of: March 20, 2017 Content Version: 11.4 © 7540-7741 [employer]. Care instructions adapted under license by [employer] (which disclaims liability or warranty for this information). If you have questions about a medical condition or this instruction, always ask your healthcare professional. [place] [address] any warranty or liability for your use of this information. Netbooks Announcement We are excited to announce that we are making your provider's discharge notes available to you in Netbooks. You will see these notes when they are completed and signed by the physician that discharged you from your recent hospital stay. If you have any questions or concerns about any information you see in Netbooks, please call the Health Information Department where you were seen or reach out to your Primary Care Provider for more information about your plan of care. Introducing \[facility]\"" & HEALTH SERVICES! Jillian Keller introduces Netbooks patient portal. Now you can access parts of your medical record, email your doctor's office, and request medication refills online. 1. In your internet browser, go to https://[employer] 2. Click on the First Time User? Click Here link in the Sign In box. You will see the New Member Sign Up page. 3. Enter your Netbooks Access Code exactly as it appears below. You will not need to use this code after youve completed the sign-up process.  If you do not sign up before the expiration date, you must request a new code. · [employer] Access Code: [email or web address] Expires: 5/14/2018  1:13 PM 
 
4. Enter the last four digits of your Social Security Number (xxxx) and Date of Birth (mm/dd/yyyy) as indicated and click Submit. You will be taken to the next sign-up page. 5. Create a [employer] ID. This will be your [employer] login ID and cannot be changed, so think of one that is secure and easy to remember. 6. Create a [employer] password. You can change your password at any time. 7. Enter your Password Reset Question and Answer. This can be used at a later time if you forget your password. 8. Enter your e-mail address. You will receive e-mail notification when new information is available in [address] 9. Click Sign Up. You can now view and download portions of your medical record. 10. Click the Download Summary menu link to download a portable copy of your medical information. If you have questions, please visit the Frequently Asked Questions section of the [employer] website. Remember, [employer] is NOT to be used for urgent needs. For medical emergencies, dial 911. Now available from your iPhone and Android! Providers Seen During Your Hospitalization Provider Specialty Primary office phone Humberto Castelan MD Emergency Medicine [phone] Lillian Arellano MD Family Practice [phone] Your Primary Care Physician (PCP) Primary Care Physician Office Phone Office Fax OTHER, PHYS ** None ** ** None ** You are allergic to the following No active allergies Emergency Contacts Name Discharge Info Relation Home Work Mobile Hope Padron DISCHARGE CAREGIVER [3] Daughter [21] [phone] Patient Belongings  The following personal items are in your possession at time of discharge: 
  Dental Appliances: None  Visual Aid: None      Home Medications: None Heverelry: None  Clothing: Shirt, Pants    Other Valuables: Cell Phone, At bedside (credit cards) Please provide this summary of care documentation to your next provider. Signatures-by signing, you are acknowledging that this After Visit Summary has been reviewed with you and you have received a copy. Patient Signature:  ____________________________________________________________ Date:  ____________________________________________________________  
  
[facility] Provider Signature:  ____________________________________________________________ Date:  ____________________________________________________________

## 2018-02-14 ENCOUNTER — APPOINTMENT (OUTPATIENT)
Dept: CT IMAGING | Age: 63
End: 2018-02-14
Attending: PHYSICIAN ASSISTANT
Payer: COMMERCIAL

## 2018-02-14 VITALS
DIASTOLIC BLOOD PRESSURE: 92 MMHG | HEART RATE: 71 BPM | SYSTOLIC BLOOD PRESSURE: 149 MMHG | OXYGEN SATURATION: 97 % | TEMPERATURE: 97.7 F | RESPIRATION RATE: 14 BRPM

## 2018-02-14 LAB
ANION GAP SERPL CALC-SCNC: 5 MMOL/L (ref 3–18)
BUN SERPL-MCNC: 14 MG/DL (ref 7–18)
BUN/CREAT SERPL: 13 (ref 12–20)
CALCIUM SERPL-MCNC: 8.1 MG/DL (ref 8.5–10.1)
CHLORIDE SERPL-SCNC: 108 MMOL/L (ref 100–108)
CHOLEST SERPL-MCNC: 135 MG/DL
CO2 SERPL-SCNC: 30 MMOL/L (ref 21–32)
CREAT SERPL-MCNC: 1.12 MG/DL (ref 0.6–1.3)
ERYTHROCYTE [DISTWIDTH] IN BLOOD BY AUTOMATED COUNT: 13.7 % (ref 11.6–14.5)
EST. AVERAGE GLUCOSE BLD GHB EST-MCNC: 123 MG/DL
GLUCOSE SERPL-MCNC: 91 MG/DL (ref 74–99)
HBA1C MFR BLD: 5.9 % (ref 4.5–5.6)
HCT VFR BLD AUTO: 43.3 % (ref 36–48)
HDLC SERPL-MCNC: 50 MG/DL (ref 40–60)
HDLC SERPL: 2.7 {RATIO} (ref 0–5)
HGB BLD-MCNC: 13.9 G/DL (ref 13–16)
LDLC SERPL CALC-MCNC: 70 MG/DL (ref 0–100)
LIPID PROFILE,FLP: NORMAL
MCH RBC QN AUTO: 30.3 PG (ref 24–34)
MCHC RBC AUTO-ENTMCNC: 32.1 G/DL (ref 31–37)
MCV RBC AUTO: 94.3 FL (ref 74–97)
PLATELET # BLD AUTO: 209 K/UL (ref 135–420)
PMV BLD AUTO: 9.4 FL (ref 9.2–11.8)
POTASSIUM SERPL-SCNC: 4.1 MMOL/L (ref 3.5–5.5)
RBC # BLD AUTO: 4.59 M/UL (ref 4.7–5.5)
SODIUM SERPL-SCNC: 143 MMOL/L (ref 136–145)
TRIGL SERPL-MCNC: 75 MG/DL (ref ?–150)
VLDLC SERPL CALC-MCNC: 15 MG/DL
WBC # BLD AUTO: 6.1 K/UL (ref 4.6–13.2)

## 2018-02-14 PROCEDURE — 83036 HEMOGLOBIN GLYCOSYLATED A1C: CPT | Performed by: PHYSICIAN ASSISTANT

## 2018-02-14 PROCEDURE — 71275 CT ANGIOGRAPHY CHEST: CPT

## 2018-02-14 PROCEDURE — 74011250637 HC RX REV CODE- 250/637: Performed by: HOSPITALIST

## 2018-02-14 PROCEDURE — 97116 GAIT TRAINING THERAPY: CPT

## 2018-02-14 PROCEDURE — 97161 PT EVAL LOW COMPLEX 20 MIN: CPT

## 2018-02-14 PROCEDURE — 80061 LIPID PANEL: CPT | Performed by: PHYSICIAN ASSISTANT

## 2018-02-14 PROCEDURE — 74011000250 HC RX REV CODE- 250

## 2018-02-14 PROCEDURE — 74011636320 HC RX REV CODE- 636/320: Performed by: HOSPITALIST

## 2018-02-14 PROCEDURE — 85027 COMPLETE CBC AUTOMATED: CPT | Performed by: PHYSICIAN ASSISTANT

## 2018-02-14 PROCEDURE — 97166 OT EVAL MOD COMPLEX 45 MIN: CPT

## 2018-02-14 PROCEDURE — 36415 COLL VENOUS BLD VENIPUNCTURE: CPT | Performed by: PHYSICIAN ASSISTANT

## 2018-02-14 PROCEDURE — 96374 THER/PROPH/DIAG INJ IV PUSH: CPT

## 2018-02-14 PROCEDURE — 99218 HC RM OBSERVATION: CPT

## 2018-02-14 PROCEDURE — 74011250637 HC RX REV CODE- 250/637: Performed by: PHYSICIAN ASSISTANT

## 2018-02-14 PROCEDURE — 80048 BASIC METABOLIC PNL TOTAL CA: CPT | Performed by: PHYSICIAN ASSISTANT

## 2018-02-14 RX ORDER — SODIUM CHLORIDE 9 MG/ML
15 INJECTION INTRAMUSCULAR; INTRAVENOUS; SUBCUTANEOUS ONCE
Status: COMPLETED | OUTPATIENT
Start: 2018-02-14 | End: 2018-02-14

## 2018-02-14 RX ORDER — SODIUM CHLORIDE 9 MG/ML
INJECTION INTRAMUSCULAR; INTRAVENOUS; SUBCUTANEOUS
Status: COMPLETED
Start: 2018-02-14 | End: 2018-02-14

## 2018-02-14 RX ADMIN — ASPIRIN 81 MG 162 MG: 81 TABLET ORAL at 09:51

## 2018-02-14 RX ADMIN — CLOPIDOGREL BISULFATE 75 MG: 75 TABLET ORAL at 09:53

## 2018-02-14 RX ADMIN — IOPAMIDOL 100 ML: 755 INJECTION, SOLUTION INTRAVENOUS at 11:17

## 2018-02-14 RX ADMIN — OXCARBAZEPINE 600 MG: 150 TABLET ORAL at 09:52

## 2018-02-14 RX ADMIN — LEVOTHYROXINE SODIUM 75 MCG: 75 TABLET ORAL at 06:38

## 2018-02-14 RX ADMIN — SODIUM CHLORIDE 15 ML: 9 INJECTION, SOLUTION INTRAMUSCULAR; INTRAVENOUS; SUBCUTANEOUS at 08:57

## 2018-02-14 RX ADMIN — SODIUM CHLORIDE 15 ML: 9 INJECTION INTRAMUSCULAR; INTRAVENOUS; SUBCUTANEOUS at 08:57

## 2018-02-14 RX ADMIN — LOSARTAN POTASSIUM 100 MG: 50 TABLET ORAL at 09:52

## 2018-02-14 RX ADMIN — Medication 10 ML: at 06:38

## 2018-02-14 NOTE — ROUTINE PROCESS
Bedside shift change report given to Iris Tay RN (oncoming nurse) by Darryl Hines RN (offgoing nurse). Report included the following information SBAR, Kardex and MAR.

## 2018-02-14 NOTE — ROUTINE PROCESS
Bedside and Verbal shift change report given to GABE Ozuna (oncoming nurse) by Marisela Cedeno RN (offgoing nurse). Report included the following information SBAR, Kardex and MAR. Patient had no acute events overnight. Currently resting in NAD. No express needs reported at this time.

## 2018-02-14 NOTE — PROGRESS NOTES
Problem: Self Care Deficits Care Plan (Adult)  Goal: *Acute Goals and Plan of Care (Insert Text)  Outcome: Resolved/Met Date Met: 02/14/18  Occupational Therapy EVALUATION/discharge    Patient: Sara Martines (89 y.o. male)  Date: 2/14/2018  Primary Diagnosis: TIA (transient ischemic attack)        Precautions:  Fall, Spinal, Seizure    ASSESSMENT AND RECOMMENDATIONS:  Based on the objective data described below, the patient presents with ability to perform ADL tasks at baseline level. Patient seen with PT for second set of skilled hands to maximize independence and safety. Patient well-educated on CVA and s/s. Pt reports near resolution of symptoms except slight skull base HA. While walking, pt noted R hand numbness w/ hand in dependent position. On vision assessment, noted non-smooth tracking w/ possible field cut from midline to LUQ w/ pt stating this was new. Pt with no further OT/ADL concerns at this time as pt compensating well. Education: Reviewed home safety, body mechanics, signs and symptoms of a stroke, risk factors, blood sugar management in relation to stroke, and adaptive dressing techniques with patient verbalizing understanding at this time. Stroke Education: Patient educated on signs/symptoms of stroke and importance of early intervention. Patient verbalized understanding. Discharge Recommendations: Outpatient (pt lives in [facility] referred to [facility] in his area)  Further Equipment Recommendations for Discharge: N/A      SUBJECTIVE:   Patient stated I've had a lot of these and knew what to look for.     OBJECTIVE DATA SUMMARY:     Past Medical History:   Diagnosis Date    H/O thyroidectomy 1976    HTN (hypertension)     Hyperlipidemia     Seizures ([facility] [address]      Past Surgical History:   Procedure Laterality Date    HX THYROIDECTOMY  1976     Barriers to Learning/Limitations: None  Compensate with: visual, verbal, tactile, kinesthetic cues/model    Prior Level of Function/Home Situation: supportive spouse and children; pt already looking to move out of [facility] this [place]: Private residence  # Steps to Enter: 3  Rails to Enter: Yes  Hand Rails : Left  Wheelchair Ramp: No  One/Two Story Residence: Other (Comment) (3 story)  # of Interior Steps: [phone])  Interior Rails: Left  Lift Chair Available: No  Living Alone: No  Support Systems: Spouse/Significant Other/Partner, Child(cholo) (daughter & grandchild)  Patient Expects to be Discharged to[de-identified] Private residence  Current DME Used/Available at Home: CPAP  Tub or Shower Type: Tub/Shower combination  [x]     Right hand dominant   []     Left hand dominant    Cognitive/Behavioral Status:  Neurologic State: Alert; Appropriate for age  Orientation Level: Oriented X4;Appropriate for age  Cognition: Appropriate decision making; Appropriate for age attention/concentration; Appropriate safety awareness; Follows commands  Safety/Judgement: Awareness of environment;Driving appropriateness; Fall prevention; Insight into deficits;Good awareness of safety precautions    Skin: intact  Edema: no edema noted    Vision/Perceptual:    Tracking: Able to track stimulus in all quadrants w/o difficulty (non-smooth to LUQ)    Convergence: Breaks at 7 from nose    Diplopia: No    Acuity: Within Defined Limits      Coordination:  Coordination: Within functional limits  Fine Motor Skills-Upper: Left Intact; Right Intact    Gross Motor Skills-Upper: Left Intact; Right Intact    Balance:  Sitting: Intact  Standing: Intact    Strength:  Strength:  Within functional limits  Tone & Sensation:  Tone: Normal  Sensation: Intact  Range of Motion:  AROM: Within functional limits  PROM: Within functional limits    Functional Mobility and Transfers for ADLs:  Transfers:  Sit to Stand: Independent  Bed to Chair: Independent   Toilet Transfer : Independent   Tub Transfer: Independent   Shower Transfer: Independent     ADL Assessment:  Feeding: Independent    Oral Facial Hygiene/Grooming: Independent    Bathing: Independent    Upper Body Dressing: Independent    Lower Body Dressing: Independent    Toileting: Independent    ADL Intervention:  Feeding  Feeding Assistance: Independent  Container Management: Independent  Cutting Food: Independent  Utensil Management: Independent  Food to Mouth: Independent  Drink to Mouth: Independent    Lower Body Dressing Assistance  Socks: Independent  Shoes with Cloth Laces: Independent    Cognitive Retraining  Safety/Judgement: Awareness of environment;Driving appropriateness; Fall prevention; Insight into deficits;Good awareness of safety precautions    Pain:  Pre-treatment: 0/10  Post-treatment: 0/10    Activity Tolerance:   Patient able to stand 3-4 minute(s). Patient able to complete ADLs without rest breaks. Please refer to the flowsheet for vital signs taken during this treatment. After treatment:   []  Patient left in no apparent distress sitting up in chair  [x]  Patient left in no apparent distress in bed  [x]  Call bell left within reach  [x]  Nursing notifiedAmy  []  Caregiver present  []  Bed alarm activated    COMMUNICATION/EDUCATION:   Communication/Collaboration:  [x]      Home safety education was provided and the patient/caregiver indicated understanding. [x]      Patient/family have participated as able and agree with findings and recommendations. []      Patient is unable to participate in plan of care at this time. Thank you for this referral.  Paradise Pavon, OTR/L  Time Calculation: 20 mins    G-Codes (GP)  Self Care   Current  CH= 0%   Goal  CH= 0%   D/C  CH= 0%  The severity rating is based on the professional judgement & direct observation of Level of Assistance required for Functional Mobility and ADLs.     Eval Complexity: History: HIGH Complexity : Extensive review of history including physical, cognitive and psychosocial history ; Examination: LOW Complexity : 1-3 performance deficits relating to physical, cognitive , or psychosocial skils that result in activity limitations and / or participation restrictions ; Decision Making:HIGH Complexity : Patient presents with comorbidities that affect occupational performance.  Signifigant modification of tasks or assistance (eg, physical or verbal) with assessment (s) is necessary to enable patient to complete evaluation

## 2018-02-14 NOTE — DISCHARGE INSTRUCTIONS
DISCHARGE SUMMARY from Nurse    PATIENT INSTRUCTIONS:    Recommended activity: Activity as tolerated. *  Please give a list of your current medications to your Primary Care Provider. *  Please update this list whenever your medications are discontinued, doses are      changed, or new medications (including over-the-counter products) are added. *  Please carry medication information at all times in case of emergency situations. These are general instructions for a healthy lifestyle:    No smoking/ No tobacco products/ Avoid exposure to second hand smoke  Surgeon General's Warning:  Quitting smoking now greatly reduces serious risk to your health. Obesity, smoking, and sedentary lifestyle greatly increases your risk for illness    A healthy diet, regular physical exercise & weight monitoring are important for maintaining a healthy lifestyle    You may be retaining fluid if you have a history of heart failure or if you experience any of the following symptoms:  Weight gain of 3 pounds or more overnight or 5 pounds in a week, increased swelling in our hands or feet or shortness of breath while lying flat in bed. Please call your doctor as soon as you notice any of these symptoms; do not wait until your next office visit. Recognize signs and symptoms of STROKE:    F-face looks uneven    A-arms unable to move or move unevenly    S-speech slurred or non-existent    T-time-call 911 as soon as signs and symptoms begin-DO NOT go       Back to bed or wait to see if you get better-TIME IS BRAIN. Warning Signs of HEART ATTACK     Call 911 if you have these symptoms:   Chest discomfort. Most heart attacks involve discomfort in the center of the chest that lasts more than a few minutes, or that goes away and comes back. It can feel like uncomfortable pressure, squeezing, fullness, or pain.  Discomfort in other areas of the upper body.  Symptoms can include pain or discomfort in one or both arms, the back, neck, jaw, or stomach.  Shortness of breath with or without chest discomfort.  Other signs may include breaking out in a cold sweat, nausea, or lightheadedness. Don't wait more than five minutes to call 911 - MINUTES MATTER! Fast action can save your life. Calling 911 is almost always the fastest way to get lifesaving treatment. Emergency Medical Services staff can begin treatment when they arrive -- up to an hour sooner than if someone gets to the hospital by car. The discharge information has been reviewed with the patient. The patient verbalized understanding. Discharge medications reviewed with the patient and appropriate educational materials and side effects teaching were provided. ___________________________________________________________________________________________________________________________________     Headache: Care Instructions  Your Care Instructions    Headaches have many possible causes. Most headaches aren't a sign of a more serious problem, and they will get better on their own. Home treatment may help you feel better faster. The doctor has checked you carefully, but problems can develop later. If you notice any problems or new symptoms, get medical treatment right away. Follow-up care is a key part of your treatment and safety. Be sure to make and go to all appointments, and call your doctor if you are having problems. It's also a good idea to know your test results and keep a list of the medicines you take. How can you care for yourself at home? · Do not drive if you have taken a prescription pain medicine. · Rest in a quiet, dark room until your headache is gone. Close your eyes and try to relax or go to sleep. Don't watch TV or read. · Put a cold, moist cloth or cold pack on the painful area for 10 to 20 minutes at a time. Put a thin cloth between the cold pack and your skin.   · Use a warm, moist towel or a heating pad set on low to relax tight shoulder and neck muscles. · Have someone gently massage your neck and shoulders. · Take pain medicines exactly as directed. ¨ If the doctor gave you a prescription medicine for pain, take it as prescribed. ¨ If you are not taking a prescription pain medicine, ask your doctor if you can take an over-the-counter medicine. · Be careful not to take pain medicine more often than the instructions allow, because you may get worse or more frequent headaches when the medicine wears off. · Do not ignore new symptoms that occur with a headache, such as a fever, weakness or numbness, vision changes, or confusion. These may be signs of a more serious problem. To prevent headaches  · Keep a headache diary so you can figure out what triggers your headaches. Avoiding triggers may help you prevent headaches. Record when each headache began, how long it lasted, and what the pain was like (throbbing, aching, stabbing, or dull). Write down any other symptoms you had with the headache, such as nausea, flashing lights or dark spots, or sensitivity to bright light or loud noise. Note if the headache occurred near your period. List anything that might have triggered the headache, such as certain foods (chocolate, cheese, wine) or odors, smoke, bright light, stress, or lack of sleep. · Find healthy ways to deal with stress. Headaches are most common during or right after stressful times. Take time to relax before and after you do something that has caused a headache in the past.  · Try to keep your muscles relaxed by keeping good posture. Check your jaw, face, neck, and shoulder muscles for tension, and try relaxing them. When sitting at a desk, change positions often, and stretch for 30 seconds each hour. · Get plenty of sleep and exercise. · Eat regularly and well. Long periods without food can trigger a headache. · Treat yourself to a massage. Some people find that regular massages are very helpful in relieving tension.   · Limit caffeine by not drinking too much coffee, tea, or soda. But don't quit caffeine suddenly, because that can also give you headaches. · Reduce eyestrain from computers by blinking frequently and looking away from the computer screen every so often. Make sure you have proper eyewear and that your monitor is set up properly, about an arm's length away. · Seek help if you have depression or anxiety. Your headaches may be linked to these conditions. Treatment can both prevent headaches and help with symptoms of anxiety or depression. When should you call for help? Call 911 anytime you think you may need emergency care. For example, call if:  ? · You have signs of a stroke. These may include:  ¨ Sudden numbness, paralysis, or weakness in your face, arm, or leg, especially on only one side of your body. ¨ Sudden vision changes. ¨ Sudden trouble speaking. ¨ Sudden confusion or trouble understanding simple statements. ¨ Sudden problems with walking or balance. ¨ A sudden, severe headache that is different from past headaches. ?Call your doctor now or seek immediate medical care if:  ? · You have a new or worse headache. ? · Your headache gets much worse. Where can you learn more? Go to http://holland-karine.info/. Enter M271 in the search box to learn more about \"Headache: Care Instructions. \"  Current as of: October 14, 2016  Content Version: 11.4  © 8159-3137 [employer]. Care instructions adapted under license by [employer] (which disclaims liability or warranty for this information). If you have questions about a medical condition or this instruction, always ask your healthcare professional. [place] [address] any warranty or liability for your use of this information. High Blood Pressure: Care Instructions  Your Care Instructions    If your blood pressure is usually above 140/90, you have high blood pressure, or hypertension.  That means the top number is 140 or higher or the bottom number is 90 or higher, or both. Despite what a lot of people think, high blood pressure usually doesn't cause headaches or make you feel dizzy or lightheaded. It usually has no symptoms. But it does increase your risk for heart attack, stroke, and kidney or eye damage. The higher your blood pressure, the more your risk increases. Your doctor will give you a goal for your blood pressure. Your goal will be based on your health and your age. An example of a goal is to keep your blood pressure below 140/90. Lifestyle changes, such as eating healthy and being active, are always important to help lower blood pressure. You might also take medicine to reach your blood pressure goal.  Follow-up care is a key part of your treatment and safety. Be sure to make and go to all appointments, and call your doctor if you are having problems. It's also a good idea to know your test results and keep a list of the medicines you take. How can you care for yourself at home? Medical treatment  · If you stop taking your medicine, your blood pressure will go back up. You may take one or more types of medicine to lower your blood pressure. Be safe with medicines. Take your medicine exactly as prescribed. Call your doctor if you think you are having a problem with your medicine. · Talk to your doctor before you start taking aspirin every day. Aspirin can help certain people lower their risk of a heart attack or stroke. But taking aspirin isn't right for everyone, because it can cause serious bleeding. · See your doctor regularly. You may need to see the doctor more often at first or until your blood pressure comes down. · If you are taking blood pressure medicine, talk to your doctor before you take decongestants or anti-inflammatory medicine, such as ibuprofen. Some of these medicines can raise blood pressure. · Learn how to check your blood pressure at home.   Lifestyle changes  · Stay at a healthy weight. This is especially important if you put on weight around the waist. Losing even 10 pounds can help you lower your blood pressure. · If your doctor recommends it, get more exercise. Walking is a good choice. Bit by bit, increase the amount you walk every day. Try for at least 30 minutes on most days of the week. You also may want to swim, bike, or do other activities. · Avoid or limit alcohol. Talk to your doctor about whether you can drink any alcohol. · Try to limit how much sodium you eat to less than 2,300 milligrams (mg) a day. Your doctor may ask you to try to eat less than 1,500 mg a day. · Eat plenty of fruits (such as bananas and oranges), vegetables, legumes, whole grains, and low-fat dairy products. · Lower the amount of saturated fat in your diet. Saturated fat is found in animal products such as milk, cheese, and meat. Limiting these foods may help you lose weight and also lower your risk for heart disease. · Do not smoke. Smoking increases your risk for heart attack and stroke. If you need help quitting, talk to your doctor about stop-smoking programs and medicines. These can increase your chances of quitting for good. When should you call for help? Call 911 anytime you think you may need emergency care. This may mean having symptoms that suggest that your blood pressure is causing a serious heart or blood vessel problem. Your blood pressure may be over 180/110. ? For example, call 911 if:  ? · You have symptoms of a heart attack. These may include:  ¨ Chest pain or pressure, or a strange feeling in the chest.  ¨ Sweating. ¨ Shortness of breath. ¨ Nausea or vomiting. ¨ Pain, pressure, or a strange feeling in the back, neck, jaw, or upper belly or in one or both shoulders or arms. ¨ Lightheadedness or sudden weakness. ¨ A fast or irregular heartbeat. ? · You have symptoms of a stroke.  These may include:  ¨ Sudden numbness, tingling, weakness, or loss of movement in your face, arm, or leg, especially on only one side of your body. ¨ Sudden vision changes. ¨ Sudden trouble speaking. ¨ Sudden confusion or trouble understanding simple statements. ¨ Sudden problems with walking or balance. ¨ A sudden, severe headache that is different from past headaches. ? · You have severe back or belly pain. ?Do not wait until your blood pressure comes down on its own. Get help right away. ?Call your doctor now or seek immediate care if:  ? · Your blood pressure is much higher than normal (such as 180/110 or higher), but you don't have symptoms. ? · You think high blood pressure is causing symptoms, such as:  ¨ Severe headache. ¨ Blurry vision. ? Watch closely for changes in your health, and be sure to contact your doctor if:  ? · Your blood pressure measures 140/90 or higher at least 2 times. That means the top number is 140 or higher or the bottom number is 90 or higher, or both. ? · You think you may be having side effects from your blood pressure medicine. ? · Your blood pressure is usually normal, but it goes above normal at least 2 times. Where can you learn more? Go to http://holland-karine.info/. Enter K520 in the search box to learn more about \"High Blood Pressure: Care Instructions. \"  Current as of: September 21, 2016  Content Version: 11.4  © 7377-0203 [employer]. Care instructions adapted under license by [employer] (which disclaims liability or warranty for this information). If you have questions about a medical condition or this instruction, always ask your healthcare professional. [place] [address] any warranty or liability for your use of this information. Transient Ischemic Attack: Care Instructions  Your Care Instructions    A transient ischemic attack (TIA) is when blood flow to a part of your brain is blocked for a short time. A TIA is like a stroke but usually lasts only a few minutes.  A TIA does not cause lasting brain damage. Any vision problems, slurred speech, or other symptoms usually go away in 10 to 20 minutes. But they may last for up to 24 hours. TIAs are often warning signs of a stroke. Some people who have a TIA may have a stroke in the future. A stroke can cause symptoms like those of a TIA. But a stroke causes lasting damage to your brain. You can take steps to help prevent a stroke. One thing you can do is get early treatment. If you have other new symptoms, or if your symptoms do not get better, go back to the emergency room or call your doctor right away. Getting treatment right away may prevent long-term brain damage caused by a stroke. The doctor has checked you carefully, but problems can develop later. If you notice any problems or new symptoms, get medical treatment right away. Follow-up care is a key part of your treatment and safety. Be sure to make and go to all appointments, and call your doctor if you are having problems. It's also a good idea to know your test results and keep a list of the medicines you take. How can you care for yourself at home? Medicines  ? · Be safe with medicines. Take your medicines exactly as prescribed. Call your doctor if you think you are having a problem with your medicine. ? · If you take a blood thinner, such as aspirin, be sure you get instructions about how to take your medicine safely. Blood thinners can cause serious bleeding problems. ? · Call your doctor if you are not able to take your medicines for any reason. ? · Do not take any over-the-counter medicines or herbal products without talking to your doctor first.   ? · If you take birth control pills or hormone therapy, talk to your doctor. Ask if these treatments are right for you. ? Lifestyle changes  ? · Do not smoke. If you need help quitting, talk to your doctor about stop-smoking programs and medicines. ? · Be active. If your doctor recommends it, get more exercise.  Walking is a good choice. Bit by bit, increase the amount you walk every day. Try for at least 30 minutes on most days of the week. You also may want to swim, bike, or do other activities. ? · Eat heart-healthy foods. These include fruits, vegetables, high-fiber foods, fish, and foods that are low in sodium, saturated fat, and trans fat. ? · Stay at a healthy weight. Lose weight if you need to.   ? · Limit alcohol to 2 drinks a day for men and 1 drink a day for women. ?Staying healthy  ? · Manage other health problems such as diabetes, high blood pressure, and high cholesterol. ? · Get the flu vaccine every year. When should you call for help? Call 911 anytime you think you may need emergency care. For example, call if:  ? · You have new or worse symptoms of a stroke. These may include:  ¨ Sudden numbness, tingling, weakness, or loss of movement in your face, arm, or leg, especially on only one side of your body. ¨ Sudden vision changes. ¨ Sudden trouble speaking. ¨ Sudden confusion or trouble understanding simple statements. ¨ Sudden problems with walking or balance. ¨ A sudden, severe headache that is different from past headaches. Call 911 even if these symptoms go away in a few minutes. ? · You feel like you are having another TIA. ? Watch closely for changes in your health, and be sure to contact your doctor if you have any problems. Where can you learn more? Go to http://holland-karine.info/. Enter [phone] in the search box to learn more about \"Transient Ischemic Attack: Care Instructions. \"  Current as of: March 20, 2017  Content Version: 11.4  © 1798-0471 [employer]. Care instructions adapted under license by [employer] (which disclaims liability or warranty for this information).  If you have questions about a medical condition or this instruction, always ask your healthcare professional. [address]  any warranty or liability for your use of this information.

## 2018-02-14 NOTE — ROUTINE PROCESS
- Assumed pt care. Received bedside and verbal SBAR from [facility]. No s/s of distress noted. - Pt states \"when looking up to the ULQ with left eye, my eye feels strange, almost feels like it is popping a little\" No delay in eye movement or motion observed when pt looked up and to the left. When sensation is assessed, Pt states right side ( face,cheek) has stronger sensation than Left side( face, cheek). Pt also feels a buckling sensation behind both knees while ambulating in gil with PT. Knees did not actually buckle, just sensation. - 0948 Pt off floor for ECHO  - 0905 return from ECHO. Back on floor. - 1102 pt off floor for CT  - 1130 pt return from CT. Pt back on floor.  - Pt to be discharged. - Ruslan Mathews RN discharged pt.

## 2018-02-14 NOTE — PROGRESS NOTES
Chart Reviewed. Noted patient admitted to the hospital for further medical management. Care Management to follow up with patient and/or family for transition of care needs prn. Readmission Risk Assessment: Low Risk and MSSP/Good Help ACO patients    RRAT Score: 1 - 12    Initial Assessment:  presents to the emergency department via EMS C/O right face and left sided of body weakness onset 4 hours ago. 02/14/201/ 1035am met with patient at bedside. Informed cm he was here visiting with daughter he lives in [place]. Reports he lives with his wife at home. States he has a cpap machine at home. He denies needs from or dme. Cm will continue to follow    Emergency Contact: see facesheet    Pertinent Medical Hx: TIA, Stroke    PCP/Specialists: not listed on chart at time of chart reveiw    Community Services:     DME:     Low Risk Care Transition Plan:  1. Evaluate for [facility] or [facility], [facility] care coordination of resources  2. Involve patient/caregiver in assessment, planning, education and implement of intervention. 3. CM daily patient care huddles/interdisciplinary rounds. 4. PCP/Specialist appointment within 7 - 10 days made prior to discharge. 5. Facilitate transportation and logistics for follow-up appointments.   6. Handoff to [address] Nurse Navigator or PCP practice

## 2018-02-14 NOTE — ROUTINE PROCESS
Dual AVS reviewed with LAVON Wiseman. All medications reviewed individually with patient. Opportunities for questions and concerns provided. Patient discharged via (mode of transport ie. Car, ambulance or air transport) car  Patient's arm band appropriately discarded.

## 2018-02-14 NOTE — PROGRESS NOTES
Occupational Therapy Evaluation/Treatment Attempt    Chart reviewed. Attempted Occupational Therapy Evaluation/Treatment, however, patient unable to be seen due to:  []  Nausea/vomiting  []  Eating  []  Pain  []  Patient too lethargic  [x]  Off Unit for testing/procedure/ECHO  []  Dialysis treatment in progress   []  Telemetry Results  []  Other:     Will f/u later as patient's schedule allows.    Thank you for this referral.  Paradise Pavon, OTR/L

## 2018-02-14 NOTE — PROGRESS NOTES
Problem: Patient Education: Go to Patient Education Activity  Goal: Patient/Family Education  Outcome: Progressing Towards Goal   Stroke Education provided to patient and the following topics were discussed    1. Patients personal risk factors for stroke are hypertension, hyperlipidemia, HgA1C, sleep apnea screen and prior stroke    2. Warning signs of Stroke:        * Sudden numbness or weakness of the face, arm or leg, especially on one side of          The body            * Sudden confusion, trouble speaking or understanding        * Sudden trouble seeing in one or both eyes        * Sudden trouble walking, dizziness, loss of balance or coordination        * Sudden severe headache with no known cause      3. Importance of activation Emergency Medical Services ( 9-1-1 ) immediately if experience any warning signs of stroke. 4. Be sure and schedule a follow-up appointment with your primary care doctor or any specialists as instructed. 5. You must take medicine every day to treat your risk factors for stroke. Be sure to take your medicines exactly as your doctor tells you: no more, no less. Know what your medicines are for , what they do. Anti-thrombotics /anticoagulants can help prevent strokes. You are taking the following medicine(s)  Plavix, Aspirin, Lipitor     6. Smoking and second-hand smoke greatly increase your risk of stroke, cardiovascular disease and death. Smoking history never    7. Information provided was [facility] Stroke Education Binder, Stroke Handouts or Verbal Education    8. Documentation of teaching completed in Patient Education Activity and on Care Plan with teaching response noted? yes      Comments: Spoke to Mr. Lin Carl in length about the events that lead to his coming to the ED. Pt talked about his history of TIA's and verbalized having been told he has had strokes although the MRI did not show evidence of previous strokes.  Mr. Lin aCrl was very interested in reviewing the results and seeing the images of his brain. Discussion his history of HTN, \"sugar problems\", Hyperlipidemia and Sleep apnea. HgAIC 5.9. He sees a neurologies in his home area in [address] [place] [address]. He is very active and exercises daily, takes medications as prescribed and watches his diet. We discussed the importance of follow up with his PCP and neurologist after he gets back to [facility] (his wife will take him home). He knows the s/s of stroke, risk factors, follow up care, calling 911 and the medications for stroke/TIA.
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
